# Patient Record
Sex: MALE | Race: WHITE | NOT HISPANIC OR LATINO | Employment: OTHER | RURAL
[De-identification: names, ages, dates, MRNs, and addresses within clinical notes are randomized per-mention and may not be internally consistent; named-entity substitution may affect disease eponyms.]

---

## 2017-08-15 ENCOUNTER — HISTORICAL (OUTPATIENT)
Dept: ADMINISTRATIVE | Facility: HOSPITAL | Age: 57
End: 2017-08-15

## 2017-08-16 LAB
LAB AP CLINICAL INFORMATION: NORMAL
LAB AP COMMENTS: NORMAL
LAB AP DIAGNOSIS - HISTORICAL: NORMAL
LAB AP GROSS PATHOLOGY - HISTORICAL: NORMAL
LAB AP SPECIMEN SUBMITTED - HISTORICAL: NORMAL

## 2019-10-07 ENCOUNTER — HISTORICAL (OUTPATIENT)
Dept: ADMINISTRATIVE | Facility: HOSPITAL | Age: 59
End: 2019-10-07

## 2019-10-07 LAB — CRC RECOMMENDATION EXT: NORMAL

## 2019-10-08 LAB
LAB AP CLINICAL INFORMATION: NORMAL
LAB AP DIAGNOSIS - HISTORICAL: NORMAL
LAB AP GROSS PATHOLOGY - HISTORICAL: NORMAL
LAB AP SPECIMEN SUBMITTED - HISTORICAL: NORMAL

## 2021-04-27 ENCOUNTER — OFFICE VISIT (OUTPATIENT)
Dept: UROLOGY | Facility: CLINIC | Age: 61
End: 2021-04-27
Payer: COMMERCIAL

## 2021-04-27 VITALS
WEIGHT: 200 LBS | DIASTOLIC BLOOD PRESSURE: 76 MMHG | SYSTOLIC BLOOD PRESSURE: 145 MMHG | HEIGHT: 72 IN | BODY MASS INDEX: 27.09 KG/M2 | HEART RATE: 65 BPM

## 2021-04-27 DIAGNOSIS — N41.1 CHRONIC PROSTATITIS: ICD-10-CM

## 2021-04-27 DIAGNOSIS — N52.9 ERECTILE DYSFUNCTION, UNSPECIFIED ERECTILE DYSFUNCTION TYPE: ICD-10-CM

## 2021-04-27 DIAGNOSIS — R97.20 ELEVATED PROSTATE SPECIFIC ANTIGEN (PSA): Primary | ICD-10-CM

## 2021-04-27 DIAGNOSIS — N40.1 BENIGN PROSTATIC HYPERPLASIA WITH URINARY FREQUENCY: ICD-10-CM

## 2021-04-27 DIAGNOSIS — R35.0 BENIGN PROSTATIC HYPERPLASIA WITH URINARY FREQUENCY: ICD-10-CM

## 2021-04-27 DIAGNOSIS — R97.20 ELEVATED PSA: Primary | ICD-10-CM

## 2021-04-27 PROCEDURE — 3008F BODY MASS INDEX DOCD: CPT | Mod: ,,, | Performed by: UROLOGY

## 2021-04-27 PROCEDURE — 99203 OFFICE O/P NEW LOW 30 MIN: CPT | Mod: PBBFAC | Performed by: UROLOGY

## 2021-04-27 PROCEDURE — 99214 OFFICE O/P EST MOD 30 MIN: CPT | Mod: S$PBB,,, | Performed by: UROLOGY

## 2021-04-27 PROCEDURE — 1126F AMNT PAIN NOTED NONE PRSNT: CPT | Mod: ,,, | Performed by: UROLOGY

## 2021-04-27 PROCEDURE — 99999 PR PBB SHADOW E&M-NEW PATIENT-LVL III: CPT | Mod: PBBFAC,,, | Performed by: UROLOGY

## 2021-04-27 PROCEDURE — 1126F PR PAIN SEVERITY QUANTIFIED, NO PAIN PRESENT: ICD-10-PCS | Mod: ,,, | Performed by: UROLOGY

## 2021-04-27 PROCEDURE — 99999 PR PBB SHADOW E&M-NEW PATIENT-LVL III: ICD-10-PCS | Mod: PBBFAC,,, | Performed by: UROLOGY

## 2021-04-27 PROCEDURE — 3008F PR BODY MASS INDEX (BMI) DOCUMENTED: ICD-10-PCS | Mod: ,,, | Performed by: UROLOGY

## 2021-04-27 PROCEDURE — 99214 PR OFFICE/OUTPT VISIT, EST, LEVL IV, 30-39 MIN: ICD-10-PCS | Mod: S$PBB,,, | Performed by: UROLOGY

## 2021-11-01 ENCOUNTER — OFFICE VISIT (OUTPATIENT)
Dept: FAMILY MEDICINE | Facility: CLINIC | Age: 61
End: 2021-11-01
Payer: COMMERCIAL

## 2021-11-01 ENCOUNTER — OFFICE VISIT (OUTPATIENT)
Dept: UROLOGY | Facility: CLINIC | Age: 61
End: 2021-11-01
Payer: COMMERCIAL

## 2021-11-01 ENCOUNTER — HOSPITAL ENCOUNTER (OUTPATIENT)
Dept: RADIOLOGY | Facility: HOSPITAL | Age: 61
Discharge: HOME | End: 2021-11-01
Attending: FAMILY MEDICINE
Payer: COMMERCIAL

## 2021-11-01 VITALS
SYSTOLIC BLOOD PRESSURE: 132 MMHG | HEART RATE: 61 BPM | TEMPERATURE: 98 F | HEIGHT: 71 IN | BODY MASS INDEX: 27.72 KG/M2 | RESPIRATION RATE: 18 BRPM | DIASTOLIC BLOOD PRESSURE: 67 MMHG | OXYGEN SATURATION: 98 % | WEIGHT: 198 LBS

## 2021-11-01 VITALS
WEIGHT: 198 LBS | SYSTOLIC BLOOD PRESSURE: 132 MMHG | BODY MASS INDEX: 26.82 KG/M2 | HEART RATE: 60 BPM | DIASTOLIC BLOOD PRESSURE: 71 MMHG | HEIGHT: 72 IN

## 2021-11-01 DIAGNOSIS — M70.22 OLECRANON BURSITIS OF LEFT ELBOW: Primary | ICD-10-CM

## 2021-11-01 DIAGNOSIS — N41.1 CHRONIC PROSTATITIS: ICD-10-CM

## 2021-11-01 DIAGNOSIS — N52.9 ERECTILE DYSFUNCTION, UNSPECIFIED ERECTILE DYSFUNCTION TYPE: ICD-10-CM

## 2021-11-01 DIAGNOSIS — N13.8 BENIGN PROSTATIC HYPERPLASIA WITH URINARY OBSTRUCTION: ICD-10-CM

## 2021-11-01 DIAGNOSIS — R97.20 ELEVATED PSA: Primary | ICD-10-CM

## 2021-11-01 DIAGNOSIS — N40.1 BENIGN PROSTATIC HYPERPLASIA WITH URINARY OBSTRUCTION: ICD-10-CM

## 2021-11-01 DIAGNOSIS — M25.422 SWELLING OF LEFT ELBOW: ICD-10-CM

## 2021-11-01 PROBLEM — N52.8 OTHER MALE ERECTILE DYSFUNCTION: Status: ACTIVE | Noted: 2021-11-01

## 2021-11-01 PROCEDURE — 1159F PR MEDICATION LIST DOCUMENTED IN MEDICAL RECORD: ICD-10-PCS | Mod: ,,, | Performed by: FAMILY MEDICINE

## 2021-11-01 PROCEDURE — 99213 PR OFFICE/OUTPT VISIT, EST, LEVL III, 20-29 MIN: ICD-10-PCS | Mod: S$PBB,,, | Performed by: UROLOGY

## 2021-11-01 PROCEDURE — 3008F PR BODY MASS INDEX (BMI) DOCUMENTED: ICD-10-PCS | Mod: ,,, | Performed by: UROLOGY

## 2021-11-01 PROCEDURE — 3075F SYST BP GE 130 - 139MM HG: CPT | Mod: ,,, | Performed by: UROLOGY

## 2021-11-01 PROCEDURE — 99213 OFFICE O/P EST LOW 20 MIN: CPT | Mod: PBBFAC | Performed by: UROLOGY

## 2021-11-01 PROCEDURE — 3075F SYST BP GE 130 - 139MM HG: CPT | Mod: ,,, | Performed by: FAMILY MEDICINE

## 2021-11-01 PROCEDURE — 99213 OFFICE O/P EST LOW 20 MIN: CPT | Mod: S$PBB,,, | Performed by: UROLOGY

## 2021-11-01 PROCEDURE — 99202 OFFICE O/P NEW SF 15 MIN: CPT | Mod: ,,, | Performed by: FAMILY MEDICINE

## 2021-11-01 PROCEDURE — 99202 PR OFFICE/OUTPT VISIT, NEW, LEVL II, 15-29 MIN: ICD-10-PCS | Mod: ,,, | Performed by: FAMILY MEDICINE

## 2021-11-01 PROCEDURE — 73070 X-RAY EXAM OF ELBOW: CPT | Mod: TC,LT

## 2021-11-01 PROCEDURE — 3078F PR MOST RECENT DIASTOLIC BLOOD PRESSURE < 80 MM HG: ICD-10-PCS | Mod: ,,, | Performed by: UROLOGY

## 2021-11-01 PROCEDURE — 3078F PR MOST RECENT DIASTOLIC BLOOD PRESSURE < 80 MM HG: ICD-10-PCS | Mod: ,,, | Performed by: FAMILY MEDICINE

## 2021-11-01 PROCEDURE — 3075F PR MOST RECENT SYSTOLIC BLOOD PRESS GE 130-139MM HG: ICD-10-PCS | Mod: ,,, | Performed by: FAMILY MEDICINE

## 2021-11-01 PROCEDURE — 3075F PR MOST RECENT SYSTOLIC BLOOD PRESS GE 130-139MM HG: ICD-10-PCS | Mod: ,,, | Performed by: UROLOGY

## 2021-11-01 PROCEDURE — 3008F BODY MASS INDEX DOCD: CPT | Mod: ,,, | Performed by: FAMILY MEDICINE

## 2021-11-01 PROCEDURE — 1159F MED LIST DOCD IN RCRD: CPT | Mod: ,,, | Performed by: FAMILY MEDICINE

## 2021-11-01 PROCEDURE — 3078F DIAST BP <80 MM HG: CPT | Mod: ,,, | Performed by: UROLOGY

## 2021-11-01 PROCEDURE — 3008F BODY MASS INDEX DOCD: CPT | Mod: ,,, | Performed by: UROLOGY

## 2021-11-01 PROCEDURE — 3008F PR BODY MASS INDEX (BMI) DOCUMENTED: ICD-10-PCS | Mod: ,,, | Performed by: FAMILY MEDICINE

## 2021-11-01 PROCEDURE — 3078F DIAST BP <80 MM HG: CPT | Mod: ,,, | Performed by: FAMILY MEDICINE

## 2021-11-01 RX ORDER — SILDENAFIL CITRATE 20 MG/1
TABLET ORAL
COMMUNITY
Start: 2021-08-02

## 2022-02-16 ENCOUNTER — OFFICE VISIT (OUTPATIENT)
Dept: FAMILY MEDICINE | Facility: CLINIC | Age: 62
End: 2022-02-16
Payer: COMMERCIAL

## 2022-02-16 VITALS
SYSTOLIC BLOOD PRESSURE: 117 MMHG | OXYGEN SATURATION: 97 % | BODY MASS INDEX: 27.72 KG/M2 | HEIGHT: 71 IN | DIASTOLIC BLOOD PRESSURE: 65 MMHG | TEMPERATURE: 99 F | HEART RATE: 86 BPM | RESPIRATION RATE: 18 BRPM | WEIGHT: 198 LBS

## 2022-02-16 DIAGNOSIS — R05.8 COUGH WITH EXPOSURE TO SEVERE ACUTE RESPIRATORY SYNDROME CORONAVIRUS 2 (SARS-COV-2): ICD-10-CM

## 2022-02-16 DIAGNOSIS — R05.9 COUGH: ICD-10-CM

## 2022-02-16 DIAGNOSIS — J02.9 SORE THROAT: ICD-10-CM

## 2022-02-16 DIAGNOSIS — J32.9 SINUSITIS, UNSPECIFIED CHRONICITY, UNSPECIFIED LOCATION: Primary | ICD-10-CM

## 2022-02-16 DIAGNOSIS — Z20.822 COUGH WITH EXPOSURE TO SEVERE ACUTE RESPIRATORY SYNDROME CORONAVIRUS 2 (SARS-COV-2): ICD-10-CM

## 2022-02-16 LAB
CTP QC/QA: YES
FLUAV AG NPH QL: NEGATIVE
FLUBV AG NPH QL: NEGATIVE
S PYO RRNA THROAT QL PROBE: NEGATIVE
SARS-COV-2 AG RESP QL IA.RAPID: NEGATIVE

## 2022-02-16 PROCEDURE — 87880 POCT RAPID STREP A: ICD-10-PCS | Mod: QW,,, | Performed by: FAMILY MEDICINE

## 2022-02-16 PROCEDURE — 87426 SARSCOV CORONAVIRUS AG IA: CPT | Mod: QW,,, | Performed by: FAMILY MEDICINE

## 2022-02-16 PROCEDURE — 3074F PR MOST RECENT SYSTOLIC BLOOD PRESSURE < 130 MM HG: ICD-10-PCS | Mod: ,,, | Performed by: FAMILY MEDICINE

## 2022-02-16 PROCEDURE — 1159F PR MEDICATION LIST DOCUMENTED IN MEDICAL RECORD: ICD-10-PCS | Mod: ,,, | Performed by: FAMILY MEDICINE

## 2022-02-16 PROCEDURE — 99214 OFFICE O/P EST MOD 30 MIN: CPT | Mod: 25,,, | Performed by: FAMILY MEDICINE

## 2022-02-16 PROCEDURE — 3078F PR MOST RECENT DIASTOLIC BLOOD PRESSURE < 80 MM HG: ICD-10-PCS | Mod: ,,, | Performed by: FAMILY MEDICINE

## 2022-02-16 PROCEDURE — 3078F DIAST BP <80 MM HG: CPT | Mod: ,,, | Performed by: FAMILY MEDICINE

## 2022-02-16 PROCEDURE — 87426 SARS CORONAVIRUS 2 ANTIGEN POCT: ICD-10-PCS | Mod: QW,,, | Performed by: FAMILY MEDICINE

## 2022-02-16 PROCEDURE — 3008F BODY MASS INDEX DOCD: CPT | Mod: ,,, | Performed by: FAMILY MEDICINE

## 2022-02-16 PROCEDURE — 99214 PR OFFICE/OUTPT VISIT, EST, LEVL IV, 30-39 MIN: ICD-10-PCS | Mod: 25,,, | Performed by: FAMILY MEDICINE

## 2022-02-16 PROCEDURE — 1159F MED LIST DOCD IN RCRD: CPT | Mod: ,,, | Performed by: FAMILY MEDICINE

## 2022-02-16 PROCEDURE — 87804 INFLUENZA ASSAY W/OPTIC: CPT | Mod: QW,,, | Performed by: FAMILY MEDICINE

## 2022-02-16 PROCEDURE — 1160F RVW MEDS BY RX/DR IN RCRD: CPT | Mod: ,,, | Performed by: FAMILY MEDICINE

## 2022-02-16 PROCEDURE — 87880 STREP A ASSAY W/OPTIC: CPT | Mod: QW,,, | Performed by: FAMILY MEDICINE

## 2022-02-16 PROCEDURE — 87804 POCT INFLUENZA A/B: ICD-10-PCS | Mod: QW,,, | Performed by: FAMILY MEDICINE

## 2022-02-16 PROCEDURE — 96372 PR INJECTION,THERAP/PROPH/DIAG2ST, IM OR SUBCUT: ICD-10-PCS | Mod: ,,, | Performed by: FAMILY MEDICINE

## 2022-02-16 PROCEDURE — 3008F PR BODY MASS INDEX (BMI) DOCUMENTED: ICD-10-PCS | Mod: ,,, | Performed by: FAMILY MEDICINE

## 2022-02-16 PROCEDURE — 1160F PR REVIEW ALL MEDS BY PRESCRIBER/CLIN PHARMACIST DOCUMENTED: ICD-10-PCS | Mod: ,,, | Performed by: FAMILY MEDICINE

## 2022-02-16 PROCEDURE — 3074F SYST BP LT 130 MM HG: CPT | Mod: ,,, | Performed by: FAMILY MEDICINE

## 2022-02-16 PROCEDURE — 96372 THER/PROPH/DIAG INJ SC/IM: CPT | Mod: ,,, | Performed by: FAMILY MEDICINE

## 2022-02-16 RX ORDER — AMOXICILLIN 875 MG/1
875 TABLET, FILM COATED ORAL EVERY 12 HOURS
Qty: 14 TABLET | Refills: 0 | Status: SHIPPED | OUTPATIENT
Start: 2022-02-16 | End: 2022-05-02

## 2022-02-16 RX ORDER — METHYLPREDNISOLONE ACETATE 40 MG/ML
40 INJECTION, SUSPENSION INTRA-ARTICULAR; INTRALESIONAL; INTRAMUSCULAR; SOFT TISSUE
Status: COMPLETED | OUTPATIENT
Start: 2022-02-16 | End: 2022-02-16

## 2022-02-16 RX ORDER — DEXAMETHASONE SODIUM PHOSPHATE 4 MG/ML
4 INJECTION, SOLUTION INTRA-ARTICULAR; INTRALESIONAL; INTRAMUSCULAR; INTRAVENOUS; SOFT TISSUE
Status: COMPLETED | OUTPATIENT
Start: 2022-02-16 | End: 2022-02-16

## 2022-02-16 RX ADMIN — DEXAMETHASONE SODIUM PHOSPHATE 4 MG: 4 INJECTION, SOLUTION INTRA-ARTICULAR; INTRALESIONAL; INTRAMUSCULAR; INTRAVENOUS; SOFT TISSUE at 03:02

## 2022-02-16 RX ADMIN — METHYLPREDNISOLONE ACETATE 40 MG: 40 INJECTION, SUSPENSION INTRA-ARTICULAR; INTRALESIONAL; INTRAMUSCULAR; SOFT TISSUE at 03:02

## 2022-02-16 NOTE — PROGRESS NOTES
New Clinic Note    Neville Polanco is a 61 y.o. male     CC:   Chief Complaint   Patient presents with    Cough     Patient has been working on removing britany in a house that had black ,old under it fr the past two days. Yesterday he started having sinus drainage, cough and runny nose. He is pretty sure it is just allergy related due to the dust and black mold.    Sinus Problem     Sinus drainage     Sore Throat    Hoarse        Subjective    History of Present Illness HPI   Patient complains of 2 days of cough and congestion. He complains of sore throat and laryngitis. He denies fever. He has not taken anything for his symptoms.     Upper or lower respiratory infection symptoms:      When did your symptoms start: yesterday  Are they getting worse: yes  What are your symptoms:cough, sore throat, sinus drainage and hoarseness  Do you have a cough: yes  Is it productive:   yes     what color: sputum thick white sputum      Current Outpatient Medications:     amoxicillin (AMOXIL) 875 MG tablet, Take 1 tablet (875 mg total) by mouth every 12 (twelve) hours., Disp: 14 tablet, Rfl: 0    brompheniramin-phenylephrin-DM (RYNEX DM) 1-2.5-5 mg/5 mL Soln, Take 10 mLs by mouth every 4 (four) hours as needed., Disp: 180 mL, Rfl: 1    sildenafil (REVATIO) 20 mg Tab, TAKE 2-5 TABLETS BY MOUTH 1 HOUR PRIOR TO INTERCOURSE (DO NOT TAKE WITH NITROGLYCERIN, NITRATES), Disp: , Rfl:   No current facility-administered medications for this visit.     Past Medical History:   Diagnosis Date    Benign prostatic hyperplasia with lower urinary tract symptoms     Chronic prostatitis     Elevated PSA     Male erectile dysfunction, unspecified         Family History   Problem Relation Age of Onset    Aneurysm Mother     COPD Father         History reviewed. No pertinent surgical history.     Review of Systems   Constitutional: Negative for fatigue and fever.   HENT: Positive for postnasal drip, rhinorrhea, sinus pressure/congestion  "and sore throat. Negative for ear pain.    Respiratory: Positive for cough. Negative for shortness of breath.    Cardiovascular: Negative for chest pain.   Gastrointestinal: Negative for abdominal pain, diarrhea, nausea and vomiting.   Genitourinary: Negative for dysuria.   Neurological: Positive for headaches.        /65 (BP Location: Right arm, Patient Position: Sitting, BP Method: Medium (Automatic))   Pulse 86   Temp 99.3 °F (37.4 °C) (Oral)   Resp 18   Ht 5' 11" (1.803 m)   Wt 89.8 kg (198 lb)   SpO2 97%   BMI 27.62 kg/m²      Physical Exam  HENT:      Head: Normocephalic and atraumatic.      Mouth/Throat:      Pharynx: Oropharynx is clear.   Pulmonary:      Effort: Pulmonary effort is normal.   Neurological:      Mental Status: He is alert and oriented to person, place, and time.   Psychiatric:         Mood and Affect: Mood normal.         Behavior: Behavior normal.          Assessment and Plan      ICD-10-CM ICD-9-CM   1. Sinusitis, unspecified chronicity, unspecified location  J32.9 473.9   2. Cough  R05.9 786.2   3. Cough with exposure to severe acute respiratory syndrome coronavirus 2 (SARS-CoV-2)  R05.8 786.2    Z20.822 V01.79   4. Sore throat  J02.9 462        Problem List Items Addressed This Visit    None     Visit Diagnoses     Sinusitis, unspecified chronicity, unspecified location    -  Primary    Relevant Medications    methylPREDNISolone acetate injection 40 mg (Completed)    dexamethasone injection 4 mg (Completed)    brompheniramin-phenylephrin-DM (RYNEX DM) 1-2.5-5 mg/5 mL Soln    amoxicillin (AMOXIL) 875 MG tablet    Cough        Relevant Orders    POCT Influenza A/B (Completed)    Cough with exposure to severe acute respiratory syndrome coronavirus 2 (SARS-CoV-2)        Relevant Orders    SARS Coronavirus 2 Antigen, POCT (Completed)    Sore throat        Relevant Orders    POCT rapid strep A (Completed)           Patient Instructions   1. Take medications as directed  2. Monitor " temperature, if fever begins, tyelenol or ibuprofen can be used as long as you have no history of abnormal reactions to these medications. Follow the instructions on the bottle or contact clinic with questions.   3. Please contact clinic if symptoms begin to get worse.   4. Report to the ER if you feel your symptoms are severe and life threatening.          Follow up if symptoms worsen or fail to improve.

## 2022-05-02 ENCOUNTER — OFFICE VISIT (OUTPATIENT)
Dept: UROLOGY | Facility: CLINIC | Age: 62
End: 2022-05-02
Payer: COMMERCIAL

## 2022-05-02 ENCOUNTER — OFFICE VISIT (OUTPATIENT)
Dept: FAMILY MEDICINE | Facility: CLINIC | Age: 62
End: 2022-05-02
Payer: COMMERCIAL

## 2022-05-02 VITALS
HEART RATE: 66 BPM | BODY MASS INDEX: 26.01 KG/M2 | WEIGHT: 192 LBS | HEIGHT: 72 IN | DIASTOLIC BLOOD PRESSURE: 89 MMHG | SYSTOLIC BLOOD PRESSURE: 152 MMHG

## 2022-05-02 VITALS
HEIGHT: 70 IN | SYSTOLIC BLOOD PRESSURE: 138 MMHG | BODY MASS INDEX: 27.63 KG/M2 | TEMPERATURE: 97 F | RESPIRATION RATE: 18 BRPM | HEART RATE: 63 BPM | DIASTOLIC BLOOD PRESSURE: 72 MMHG | WEIGHT: 193 LBS | OXYGEN SATURATION: 98 %

## 2022-05-02 DIAGNOSIS — N40.1 BENIGN PROSTATIC HYPERPLASIA WITH URINARY OBSTRUCTION: ICD-10-CM

## 2022-05-02 DIAGNOSIS — N13.8 BENIGN PROSTATIC HYPERPLASIA WITH URINARY OBSTRUCTION: ICD-10-CM

## 2022-05-02 DIAGNOSIS — R97.20 ELEVATED PSA: ICD-10-CM

## 2022-05-02 DIAGNOSIS — N41.1 CHRONIC PROSTATITIS: ICD-10-CM

## 2022-05-02 DIAGNOSIS — Z13.220 SCREENING FOR LIPID DISORDERS: ICD-10-CM

## 2022-05-02 DIAGNOSIS — R03.0 ELEVATED BLOOD-PRESSURE READING WITHOUT DIAGNOSIS OF HYPERTENSION: ICD-10-CM

## 2022-05-02 DIAGNOSIS — E78.2 MIXED HYPERLIPIDEMIA: ICD-10-CM

## 2022-05-02 DIAGNOSIS — N52.9 ERECTILE DYSFUNCTION, UNSPECIFIED ERECTILE DYSFUNCTION TYPE: ICD-10-CM

## 2022-05-02 DIAGNOSIS — R97.20 ELEVATED PSA: Primary | ICD-10-CM

## 2022-05-02 DIAGNOSIS — Z00.01 ENCOUNTER FOR ANNUAL GENERAL MEDICAL EXAMINATION WITH ABNORMAL FINDINGS IN ADULT: Primary | ICD-10-CM

## 2022-05-02 DIAGNOSIS — Z13.1 SCREENING FOR DIABETES MELLITUS (DM): ICD-10-CM

## 2022-05-02 LAB
CHOLEST SERPL-MCNC: 208 MG/DL (ref 0–200)
CHOLEST/HDLC SERPL: 4.3 {RATIO}
GLUCOSE SERPL-MCNC: 103 MG/DL (ref 74–106)
HDLC SERPL-MCNC: 48 MG/DL (ref 40–60)
LDLC SERPL CALC-MCNC: 130 MG/DL
LDLC/HDLC SERPL: 2.7 {RATIO}
NONHDLC SERPL-MCNC: 160 MG/DL
PSA SERPL-MCNC: 5.8 NG/ML (ref 0–4.1)
TRIGL SERPL-MCNC: 148 MG/DL (ref 35–150)
VLDLC SERPL-MCNC: 30 MG/DL

## 2022-05-02 PROCEDURE — 3075F SYST BP GE 130 - 139MM HG: CPT | Mod: ,,, | Performed by: FAMILY MEDICINE

## 2022-05-02 PROCEDURE — 3077F PR MOST RECENT SYSTOLIC BLOOD PRESSURE >= 140 MM HG: ICD-10-PCS | Mod: ,,, | Performed by: UROLOGY

## 2022-05-02 PROCEDURE — 3077F SYST BP >= 140 MM HG: CPT | Mod: ,,, | Performed by: UROLOGY

## 2022-05-02 PROCEDURE — 80061 LIPID PANEL: ICD-10-PCS | Mod: ,,, | Performed by: CLINICAL MEDICAL LABORATORY

## 2022-05-02 PROCEDURE — 3075F PR MOST RECENT SYSTOLIC BLOOD PRESS GE 130-139MM HG: ICD-10-PCS | Mod: ,,, | Performed by: FAMILY MEDICINE

## 2022-05-02 PROCEDURE — 3078F PR MOST RECENT DIASTOLIC BLOOD PRESSURE < 80 MM HG: ICD-10-PCS | Mod: ,,, | Performed by: FAMILY MEDICINE

## 2022-05-02 PROCEDURE — 3079F DIAST BP 80-89 MM HG: CPT | Mod: ,,, | Performed by: UROLOGY

## 2022-05-02 PROCEDURE — 3008F BODY MASS INDEX DOCD: CPT | Mod: ,,, | Performed by: FAMILY MEDICINE

## 2022-05-02 PROCEDURE — 80061 LIPID PANEL: CPT | Mod: ,,, | Performed by: CLINICAL MEDICAL LABORATORY

## 2022-05-02 PROCEDURE — 1159F PR MEDICATION LIST DOCUMENTED IN MEDICAL RECORD: ICD-10-PCS | Mod: ,,, | Performed by: FAMILY MEDICINE

## 2022-05-02 PROCEDURE — 3008F PR BODY MASS INDEX (BMI) DOCUMENTED: ICD-10-PCS | Mod: ,,, | Performed by: UROLOGY

## 2022-05-02 PROCEDURE — 99213 PR OFFICE/OUTPT VISIT, EST, LEVL III, 20-29 MIN: ICD-10-PCS | Mod: S$PBB,,, | Performed by: UROLOGY

## 2022-05-02 PROCEDURE — 1159F PR MEDICATION LIST DOCUMENTED IN MEDICAL RECORD: ICD-10-PCS | Mod: ,,, | Performed by: UROLOGY

## 2022-05-02 PROCEDURE — 1159F MED LIST DOCD IN RCRD: CPT | Mod: ,,, | Performed by: FAMILY MEDICINE

## 2022-05-02 PROCEDURE — 82947 GLUCOSE, FASTING: ICD-10-PCS | Mod: ,,, | Performed by: CLINICAL MEDICAL LABORATORY

## 2022-05-02 PROCEDURE — 99396 PR PREVENTIVE VISIT,EST,40-64: ICD-10-PCS | Mod: ,,, | Performed by: FAMILY MEDICINE

## 2022-05-02 PROCEDURE — 3008F PR BODY MASS INDEX (BMI) DOCUMENTED: ICD-10-PCS | Mod: ,,, | Performed by: FAMILY MEDICINE

## 2022-05-02 PROCEDURE — 99213 OFFICE O/P EST LOW 20 MIN: CPT | Mod: S$PBB,,, | Performed by: UROLOGY

## 2022-05-02 PROCEDURE — 99213 OFFICE O/P EST LOW 20 MIN: CPT | Mod: PBBFAC | Performed by: UROLOGY

## 2022-05-02 PROCEDURE — 3078F DIAST BP <80 MM HG: CPT | Mod: ,,, | Performed by: FAMILY MEDICINE

## 2022-05-02 PROCEDURE — 1160F PR REVIEW ALL MEDS BY PRESCRIBER/CLIN PHARMACIST DOCUMENTED: ICD-10-PCS | Mod: ,,, | Performed by: FAMILY MEDICINE

## 2022-05-02 PROCEDURE — 1160F RVW MEDS BY RX/DR IN RCRD: CPT | Mod: ,,, | Performed by: FAMILY MEDICINE

## 2022-05-02 PROCEDURE — 82947 ASSAY GLUCOSE BLOOD QUANT: CPT | Mod: ,,, | Performed by: CLINICAL MEDICAL LABORATORY

## 2022-05-02 PROCEDURE — 3079F PR MOST RECENT DIASTOLIC BLOOD PRESSURE 80-89 MM HG: ICD-10-PCS | Mod: ,,, | Performed by: UROLOGY

## 2022-05-02 PROCEDURE — 3008F BODY MASS INDEX DOCD: CPT | Mod: ,,, | Performed by: UROLOGY

## 2022-05-02 PROCEDURE — 1159F MED LIST DOCD IN RCRD: CPT | Mod: ,,, | Performed by: UROLOGY

## 2022-05-02 PROCEDURE — 99396 PREV VISIT EST AGE 40-64: CPT | Mod: ,,, | Performed by: FAMILY MEDICINE

## 2022-05-02 NOTE — PROGRESS NOTES
"New Clinic Note    Neville Polanco is a 61 y.o. male     CC:   Chief Complaint   Patient presents with    Annual Exam     Patient stated he is here for a [facility] visit.         Subjective    History of Present Illness HPI   Patient is here for a [facility] you visit.     Current Outpatient Medications:     sildenafil (REVATIO) 20 mg Tab, TAKE 2-5 TABLETS BY MOUTH 1 HOUR PRIOR TO INTERCOURSE (DO NOT TAKE WITH NITROGLYCERIN, NITRATES), Disp: , Rfl:      Past Medical History:   Diagnosis Date    Benign prostatic hyperplasia with lower urinary tract symptoms     Chronic prostatitis     Elevated PSA     Male erectile dysfunction, unspecified         Family History   Problem Relation Age of Onset    Aneurysm Mother     COPD Father         History reviewed. No pertinent surgical history.     Review of Systems   Constitutional: Negative for fatigue and fever.   HENT: Negative for ear pain, postnasal drip, rhinorrhea and sinus pressure/congestion.    Respiratory: Negative for cough and shortness of breath.    Cardiovascular: Negative for chest pain.   Gastrointestinal: Negative for abdominal pain, diarrhea, nausea and vomiting.   Genitourinary: Negative for dysuria.   Neurological: Negative for headaches.        /72 (BP Location: Left arm, Patient Position: Sitting, BP Method: Large (Automatic))   Pulse 63   Temp 97 °F (36.1 °C) (Oral)   Resp 18   Ht 5' 10" (1.778 m)   Wt 87.5 kg (193 lb)   SpO2 98%   BMI 27.69 kg/m²      Physical Exam  HENT:      Head: Normocephalic and atraumatic.   Cardiovascular:      Rate and Rhythm: Normal rate and regular rhythm.   Pulmonary:      Effort: Pulmonary effort is normal.      Breath sounds: Normal breath sounds.   Skin:     Findings: Erythema present.      Comments: Patient has an area of erythema and dry skin on the end of nose   Neurological:      Mental Status: He is alert and oriented to person, place, and time.   Psychiatric:         Mood and " Affect: Mood normal.         Behavior: Behavior normal.          Assessment and Plan      ICD-10-CM ICD-9-CM   1. Encounter for annual general medical examination with abnormal findings in adult  Z00.01 V70.0   2. Screening for diabetes mellitus (DM)  Z13.1 V77.1   3. Screening for lipid disorders  Z13.220 V77.91   4. Elevated PSA  R97.20 790.93   5. Mixed hyperlipidemia  E78.2 272.2   6. Elevated blood-pressure reading without diagnosis of hypertension  R03.0 796.2        Problem List Items Addressed This Visit    None     Visit Diagnoses     Encounter for annual general medical examination with abnormal findings in adult    -  Primary    Screening for diabetes mellitus (DM)        Relevant Orders    Glucose, Fasting (Completed)    Screening for lipid disorders        Relevant Orders    Lipid Panel (Completed)    Elevated PSA        Mixed hyperlipidemia        Elevated blood-pressure reading without diagnosis of hypertension             [employer] Wellness Plan:    Health goals to improve overall health and well-being: improve healthy choices  Biometrics:  attend regular scheduled office visit  Lifestyle:  develop good social support group  Nutrition:  eat well balanced meals  Exercise:  recommended physical activity 30 minutes 5 days a week  Tobacco:  avoid all tobacco products including second and  exposure        Patient Instructions   Use aquaphor on dry skin.        Follow up in about 1 year (around 5/2/2023).

## 2022-05-02 NOTE — PATIENT INSTRUCTIONS
Patient doing okay with voiding.  PSA returned after he left as 6.120.  This is the highest he has ever had but he had MRI just couple years ago.  I think we will wait another 6 months before deciding about repeat MRI.  New prescription given as written prescription for sildenafil 100 mg number 60 with 11 refills with instructions to take 1 hour before intercourse.  Six month appointment with PSA.

## 2022-05-02 NOTE — PROGRESS NOTES
Subjective:       Patient ID: Neville Polanco is a 61 y.o. male.    Chief Complaint: Follow-up (Six month PSA, R97.20 and office visit. )      History of Present Illness  Mr. Polanco is 53 years old. He is a  with silicosis of the right lung. Sent to me by Dr. Sanjana Mclean for high normal PSA and low normal testosterone values. Main complaint is lack of energy. This is something that seems to have started only in the relatively recent past. PSA was the first one he ever had done and was on June 8; it was right at the upper end of normal at 3.110, which is right at the upper end of normal. His testosterone was 335 which is lower end of normal. Neither study has been repeated for documentation. There is no history of urinary tract infections, stones, hematuria, or any other urinary tract problems. To his knowledge there  is no family history of prostate cancer.  (August 6, 2014)     Mr. Polanco is brought back today for 6 month PSA since the PSA he had in June was in the very high normal range. We did repeat his testosterone on the above visit and it is normal at 304. The previous one had been 335. These are in the low normal range but are normal. No improvement in energy level. No new medical problems.  (December 15, 2014)     Mr. Polanco is basically the same as he was one year ago. In for his yearly checkup and his PSA is stable at 2.54. Testosterone is still technically normal at 278.1. He  has no significant energy level and loss of libido. He has very minimal sexual function. He would like to try PDE 5 inhibitors to help.  (December 15, 2015)     Mr. Polanco has had Good year in general and is voiding as well as a year ago. PSA has jumped up to 4.770 which is surprising because he is having no symptoms of prostate infection, UTIs, or any other increased problems.  (February 7, 2017)     Mr. Polanco returned for follow-up today with PSA. Was treated with Cipro on last visit but was only able to take  about half of that. He developed some rash on the parts of his body exposed to sunlight and may have had a photosensitivity related to taking medication. I don't think he had a true reaction to the drug. He only took about half. Doing okay now and PSA down slightly but still elevated for age. Voiding is stable. He did not get sildenafil filled because his drugstore apparently did not have it. Needs new prescription he can take around and get the best price.  (May 9, 2017)     Mr. Polanco was brought back because of PSA elevation. PSA today back up to 4.570. Clinically he is doing okay without any increase in voiding difficulty but definitely has elevated PSA. Probably needs to consider biopsies. (August 14 2017)     Mr. Polanco  returned today for follow-up of his chronically elevated PSA. He had biopsies on last visit and it showed acute and chronic prostatitis without any significant dysplasia, PIN, other premalignant changes. He did try the generic sildenafi and it did work satisfactorily for him. Doing okay clinically without any worsening bladder outlet obstructive symptoms or other difficulty. Patient waited to L5 hours for PSA to be returned and it never returned while he was here. Subsequently returned after he left as 3.520. Patient notified by phone.  (February 14, 2018)     Mr. Polanco is in for six-month follow-up of chronic PSA elevation. His PSA was actually down slightly last time. He's had a good 6 months with no increasing problems with voiding or other difficulties. PSA is pending.  (August 24, 2018)     Mr. Polanco has had a good 6 months overall as far as health problems are concerned. PSA back up slightly to 4.21 but has been riding a tractor for the last couple weeks working very hard. Certainly that could be a factor. No worsening bladder outlet obstructive symptoms or other new problems.  (February 25, 2019)     Mr. Polanco is in for six-month follow-up of PSA elevation. He had biopsies of  "his prostate 2 years ago. No increased trouble with voiding. Overall has had a good 6 months. A little bit of bleeding from his rectum recently and feels he might have a "polyp" but really has never had a colonoscopy and needs to have that checked because of the bleeding. PSA returned as 5.670. Has been 2 years since his biopsies. He probably needs to have additional biopsies or MRI of the prostate. He would prefer to have an MRI first before additional biopsies. (August 26, 2019)     Mr. Polanco is in for six-month follow-up of chronic PSA elevation. He had his MRI of the prostate done on October 1 at [facility]. This revealed only PI-rads 2 lesion and additional biopsies were not needed. He is stable with voiding. Had a PSA done today that is pending. He's had no further rectal bleeding. Weight is exactly the same as was 6 months ago. No new health issues. (April 20, 2020)     Mr. Polanco is in for six-month follow-up of chronic PSA elevation. Had MRI of the prostate last year and did not reveal suspicious lesions. He is stable with voiding in general and has no worsening bladder outlet obstructive symptoms. He feels he does not need anything to help with voiding. Stable overall and has had a good 6 months. (October 27, 2020)  ------------------------------------------------------------------------------------------------------------------------------------------------------------     PSA Results:  Past PSA Results   PSA was 4.870 on October 27, 2020, Past PSA Results   PSA was 3.750 on August 24, 2018, Past PSA Results   PSA was 3.520 on February 14, 2018  PSA was 4.570 on 8/14/2017  4.090 on May 9, 2017  4.770 on February 7, 2017  2.540 on December 15, 2015  2.490 on December 15, 2014  3.110 on June 8, 2014  -------------------------------------------------------------------------------------------------------------------------------------------------------------------------------------------     . " Sherri is in for six-month follow-up of chronic PSA elevation.  He is doing fine clinically with no worsening bladder outlet obstructive symptoms.  PSA pending when he was here.  One previous negative biopsy in 1 MRI that did not reveal suspicious lesions.  PSA returned after he left and is up slightly to 5.490. (April 27, 2021)     PSA was 5.490 on April 27, 2021  PSA was 5.010 on November 1, 2021   PSA was 6.120 on May 2, 2022     Mr. Polanco is in for 6 month follow-up of PSA elevation.  He has had a good 6 months with no worsening bladder outlet obstructive symptoms.  He has had no new health issues that he is aware of.  He had his PSA drawn and results are pending.(November 1, 2021)     Mr. Polanco is in for his 6 monthFollow-up of PSA elevation.  PSA drawn earlier today and is pending.  He has had a reasonably good 6 months except when he aspirated some black mold causing acute pulmonary problems.  That problem is better now.  No new  issues.  Voiding as well as he did last time he was here. (May 2, 2022)    Review of Systems      Objective:      Physical Exam  Constitutional:       Appearance: Normal appearance. He is normal weight.   Genitourinary:     Prostate: Normal.      Rectum: Normal.      Comments: Prostate gland 30 g smooth firm and symmetrical  Neurological:      Mental Status: He is alert.   Psychiatric:         Mood and Affect: Mood normal.         Behavior: Behavior normal.       urinalysis revealed only occasional pus cells.  Dipstick negative with pH 5.0 and specific gravity 1.010  Assessment:       Problem List Items Addressed This Visit    None     Visit Diagnoses     Elevated PSA    -  Primary    Relevant Orders    PSA, Total (Diagnostic)    Chronic prostatitis        Erectile dysfunction, unspecified erectile dysfunction type        Benign prostatic hyperplasia with urinary obstruction              Plan:     Patient doing okay with voiding.  PSA returned after he left as 6.120.  This is  the highest he has ever had but he had MRI just couple years ago.  I think we will wait another 6 months before deciding about repeat MRI.  New prescription given as written prescription for sildenafil 100 mg number 60 with 11 refills with instructions to take 1 hour before intercourse.  Six month appointment with PSA.  *

## 2022-11-08 ENCOUNTER — OFFICE VISIT (OUTPATIENT)
Dept: UROLOGY | Facility: CLINIC | Age: 62
End: 2022-11-08
Payer: COMMERCIAL

## 2022-11-08 ENCOUNTER — OFFICE VISIT (OUTPATIENT)
Dept: FAMILY MEDICINE | Facility: CLINIC | Age: 62
End: 2022-11-08
Payer: COMMERCIAL

## 2022-11-08 VITALS
DIASTOLIC BLOOD PRESSURE: 80 MMHG | BODY MASS INDEX: 26.41 KG/M2 | SYSTOLIC BLOOD PRESSURE: 118 MMHG | HEART RATE: 83 BPM | HEIGHT: 72 IN | OXYGEN SATURATION: 95 % | WEIGHT: 195 LBS | RESPIRATION RATE: 18 BRPM

## 2022-11-08 VITALS
DIASTOLIC BLOOD PRESSURE: 69 MMHG | HEIGHT: 72 IN | BODY MASS INDEX: 26.41 KG/M2 | SYSTOLIC BLOOD PRESSURE: 144 MMHG | WEIGHT: 195 LBS | HEART RATE: 61 BPM

## 2022-11-08 DIAGNOSIS — N13.8 BENIGN PROSTATIC HYPERPLASIA WITH URINARY OBSTRUCTION: ICD-10-CM

## 2022-11-08 DIAGNOSIS — N52.9 ERECTILE DYSFUNCTION, UNSPECIFIED ERECTILE DYSFUNCTION TYPE: ICD-10-CM

## 2022-11-08 DIAGNOSIS — Z02.89 ENCOUNTER FOR OTHER ADMINISTRATIVE EXAMINATIONS: Primary | ICD-10-CM

## 2022-11-08 DIAGNOSIS — N40.1 BENIGN PROSTATIC HYPERPLASIA WITH URINARY OBSTRUCTION: ICD-10-CM

## 2022-11-08 DIAGNOSIS — R97.20 ELEVATED PSA: Primary | ICD-10-CM

## 2022-11-08 DIAGNOSIS — N41.1 CHRONIC PROSTATITIS: ICD-10-CM

## 2022-11-08 PROCEDURE — 1159F MED LIST DOCD IN RCRD: CPT | Mod: ,,, | Performed by: UROLOGY

## 2022-11-08 PROCEDURE — 3078F DIAST BP <80 MM HG: CPT | Mod: ,,, | Performed by: UROLOGY

## 2022-11-08 PROCEDURE — 3077F PR MOST RECENT SYSTOLIC BLOOD PRESSURE >= 140 MM HG: ICD-10-PCS | Mod: ,,, | Performed by: UROLOGY

## 2022-11-08 PROCEDURE — 3008F PR BODY MASS INDEX (BMI) DOCUMENTED: ICD-10-PCS | Mod: ,,, | Performed by: UROLOGY

## 2022-11-08 PROCEDURE — 99214 OFFICE O/P EST MOD 30 MIN: CPT | Mod: PBBFAC | Performed by: UROLOGY

## 2022-11-08 PROCEDURE — 99213 PR OFFICE/OUTPT VISIT, EST, LEVL III, 20-29 MIN: ICD-10-PCS | Mod: S$PBB,,, | Performed by: UROLOGY

## 2022-11-08 PROCEDURE — 1159F PR MEDICATION LIST DOCUMENTED IN MEDICAL RECORD: ICD-10-PCS | Mod: ,,, | Performed by: UROLOGY

## 2022-11-08 PROCEDURE — 1160F RVW MEDS BY RX/DR IN RCRD: CPT | Mod: ,,, | Performed by: UROLOGY

## 2022-11-08 PROCEDURE — 99499 PR PHYSICAL - DOT/CDL: ICD-10-PCS | Mod: ,,, | Performed by: NURSE PRACTITIONER

## 2022-11-08 PROCEDURE — 3077F SYST BP >= 140 MM HG: CPT | Mod: ,,, | Performed by: UROLOGY

## 2022-11-08 PROCEDURE — 1160F PR REVIEW ALL MEDS BY PRESCRIBER/CLIN PHARMACIST DOCUMENTED: ICD-10-PCS | Mod: ,,, | Performed by: UROLOGY

## 2022-11-08 PROCEDURE — 3008F BODY MASS INDEX DOCD: CPT | Mod: ,,, | Performed by: UROLOGY

## 2022-11-08 PROCEDURE — 3078F PR MOST RECENT DIASTOLIC BLOOD PRESSURE < 80 MM HG: ICD-10-PCS | Mod: ,,, | Performed by: UROLOGY

## 2022-11-08 PROCEDURE — 99213 OFFICE O/P EST LOW 20 MIN: CPT | Mod: S$PBB,,, | Performed by: UROLOGY

## 2022-11-08 PROCEDURE — 99499 UNLISTED E&M SERVICE: CPT | Mod: ,,, | Performed by: NURSE PRACTITIONER

## 2022-11-08 NOTE — PROGRESS NOTES
Subjective:       Patient ID: Neville Polanco is a 61 y.o. male.    Chief Complaint: Follow-up (Six month PSA and office visit. )       History of Present Illness  Mr. Polanco is 53 years old. He is a  with silicosis of the right lung. Sent to me by Dr. Sanjana Mclean for high normal PSA and low normal testosterone values. Main complaint is lack of energy. This is something that seems to have started only in the relatively recent past. PSA was the first one he ever had done and was on June 8; it was right at the upper end of normal at 3.110, which is right at the upper end of normal. His testosterone was 335 which is lower end of normal. Neither study has been repeated for documentation. There is no history of urinary tract infections, stones, hematuria, or any other urinary tract problems. To his knowledge there  is no family history of prostate cancer.  (August 6, 2014)     Mr. Polanco is brought back today for 6 month PSA since the PSA he had in June was in the very high normal range. We did repeat his testosterone on the above visit and it is normal at 304. The previous one had been 335. These are in the low normal range but are normal. No improvement in energy level. No new medical problems.  (December 15, 2014)     Mr. Polanco is basically the same as he was one year ago. In for his yearly checkup and his PSA is stable at 2.54. Testosterone is still technically normal at 278.1. He  has no significant energy level and loss of libido. He has very minimal sexual function. He would like to try PDE 5 inhibitors to help.  (December 15, 2015)     Mr. Polanco has had Good year in general and is voiding as well as a year ago. PSA has jumped up to 4.770 which is surprising because he is having no symptoms of prostate infection, UTIs, or any other increased problems.  (February 7, 2017)     Mr. Polanco returned for follow-up today with PSA. Was treated with Cipro on last visit but was only able to take about  half of that. He developed some rash on the parts of his body exposed to sunlight and may have had a photosensitivity related to taking medication. I don't think he had a true reaction to the drug. He only took about half. Doing okay now and PSA down slightly but still elevated for age. Voiding is stable. He did not get sildenafil filled because his drugstore apparently did not have it. Needs new prescription he can take around and get the best price.  (May 9, 2017)     Mr. Polanco was brought back because of PSA elevation. PSA today back up to 4.570. Clinically he is doing okay without any increase in voiding difficulty but definitely has elevated PSA. Probably needs to consider biopsies. (August 14 2017)     Mr. Polanco  returned today for follow-up of his chronically elevated PSA. He had biopsies on last visit and it showed acute and chronic prostatitis without any significant dysplasia, PIN, other premalignant changes. He did try the generic sildenafi and it did work satisfactorily for him. Doing okay clinically without any worsening bladder outlet obstructive symptoms or other difficulty. Patient waited to L5 hours for PSA to be returned and it never returned while he was here. Subsequently returned after he left as 3.520. Patient notified by phone.  (February 14, 2018)     Mr. Polanco is in for six-month follow-up of chronic PSA elevation. His PSA was actually down slightly last time. He's had a good 6 months with no increasing problems with voiding or other difficulties. PSA is pending.  (August 24, 2018)     Mr. Polanco has had a good 6 months overall as far as health problems are concerned. PSA back up slightly to 4.21 but has been riding a tractor for the last couple weeks working very hard. Certainly that could be a factor. No worsening bladder outlet obstructive symptoms or other new problems.  (February 25, 2019)     Mr. Polanco is in for six-month follow-up of PSA elevation. He had biopsies of his  "prostate 2 years ago. No increased trouble with voiding. Overall has had a good 6 months. A little bit of bleeding from his rectum recently and feels he might have a "polyp" but really has never had a colonoscopy and needs to have that checked because of the bleeding. PSA returned as 5.670. Has been 2 years since his biopsies. He probably needs to have additional biopsies or MRI of the prostate. He would prefer to have an MRI first before additional biopsies. (August 26, 2019)     Mr. Polanco is in for six-month follow-up of chronic PSA elevation. He had his MRI of the prostate done on October 1 at [facility]. This revealed only PI-rads 2 lesion and additional biopsies were not needed. He is stable with voiding. Had a PSA done today that is pending. He's had no further rectal bleeding. Weight is exactly the same as was 6 months ago. No new health issues. (April 20, 2020)     Mr. Polanco is in for six-month follow-up of chronic PSA elevation. Had MRI of the prostate last year and did not reveal suspicious lesions. He is stable with voiding in general and has no worsening bladder outlet obstructive symptoms. He feels he does not need anything to help with voiding. Stable overall and has had a good 6 months. (October 27, 2020)  ------------------------------------------------------------------------------------------------------------------------------------------------------------     PSA Results:  Past PSA Results   PSA was 4.870 on October 27, 2020, Past PSA Results   PSA was 3.750 on August 24, 2018, Past PSA Results   PSA was 3.520 on February 14, 2018  PSA was 4.570 on 8/14/2017  4.090 on May 9, 2017  4.770 on February 7, 2017  2.540 on December 15, 2015  2.490 on December 15, 2014  3.110 on June 8, 2014  -------------------------------------------------------------------------------------------------------------------------------------------------------------------------------------------     Mr. Polanco" is in for six-month follow-up of chronic PSA elevation.  He is doing fine clinically with no worsening bladder outlet obstructive symptoms.  PSA pending when he was here.  One previous negative biopsy in 1 MRI that did not reveal suspicious lesions.  PSA returned after he left and is up slightly to 5.490. (April 27, 2021)     PSA was 5.490 on April 27, 2021  PSA was 5.010 on November 1, 2021   PSA was 6.120 on May 2, 2022  PSA was 4.870 on November 8, 2022     Mr. Polanco is in for 6 month follow-up of PSA elevation.  He has had a good 6 months with no worsening bladder outlet obstructive symptoms.  He has had no new health issues that he is aware of.  He had his PSA drawn and results are pending.(November 1, 2021)     Mr. Polanco is in for his 6 monthFollow-up of PSA elevation.  PSA drawn earlier today and is pending.  He has had a reasonably good 6 months except when he aspirated some black mold causing acute pulmonary problems.  That problem is better now.  No new  issues.  Voiding as well as he did last time he was here. (May 2, 2022)    Mr. Polanco is in for 6 month follow-up of chronic PSA elevation.  Doing okay clinically with no worsening bladder outlet obstructive symptoms and no new health issues.  3 lb weight gain.  PSA drawn and pending. [November 8, 2022]       Review of Systems      Objective:      Physical Exam  Constitutional:       Appearance: Normal appearance. He is normal weight.   Genitourinary:     Prostate: Normal.      Rectum: Normal.      Comments: Prostate gland is estimated 2530 g fairly symmetrical but may be a little more firm on the right side  Neurological:      Mental Status: He is alert.   Psychiatric:         Mood and Affect: Mood normal.         Behavior: Behavior normal.     Urinalysis revealed only occasional pus cells and occasional epithelial cells.  Dipstick negative with pH 6.0 and specific gravity 1.010  Assessment:       Problem List Items Addressed This Visit     None  Visit Diagnoses       Elevated PSA    -  Primary    Relevant Orders    PSA, Total (Diagnostic)    Benign prostatic hyperplasia with urinary obstruction        Chronic prostatitis        Erectile dysfunction, unspecified erectile dysfunction type                Plan:         Patient doing okay clinically with no worsening bladder outlet obstructive symptoms etc..  PSA returned after patient left and is satisfactory at 4.870.  He will be notified of results by telephone.  Six month appointment with PSA.

## 2022-11-08 NOTE — PROGRESS NOTES
Tosha Wilson DNP, LIT    [address] [place] [address]     PATIENT NAME: Neville Polanco  : 1960  DATE: 22  MRN: [id number]      Billing Provider: Tosha Wilson DNP, FNP  Level of Service:   Patient PCP Information       Provider PCP Type    Fidelia Mclean MD General            Reason for Visit / Chief Complaint: DOT physical  (Patient is here today for a DOT physical /)       Update PCP  Update Chief Complaint         History of Present Illness / Problem Focused Workflow     Neville Polanco presents to the clinic with DOT physical  (Patient is here today for a DOT physical /)     Pt here for DOT. Denies any medical history and denies taking any routine medications. Does see Dr. June for prostate issues.       Review of Systems     Review of Systems   Constitutional:  Negative for activity change and appetite change.   HENT:  Negative for dental problem, ear pain, sinus pressure/congestion and sore throat.    Respiratory:  Negative for cough, chest tightness and shortness of breath.    Cardiovascular:  Negative for chest pain and palpitations.   Gastrointestinal:  Negative for abdominal pain.   Genitourinary:  Negative for bladder incontinence and dysuria.   Musculoskeletal:  Negative for arthralgias.   Integumentary:  Negative for rash.   Neurological:  Negative for dizziness, weakness and numbness.      Medical / Social / Family History     Past Medical History:   Diagnosis Date    Benign prostatic hyperplasia with lower urinary tract symptoms     Chronic prostatitis     Elevated PSA     Male erectile dysfunction, unspecified        History reviewed. No pertinent surgical history.    Social History  Mr. Neville Polanco  reports that he has quit smoking. He has never used smokeless tobacco. He reports that he does not drink alcohol and does not use drugs.    Family History  Mr. Neville Polanco's family history includes Aneurysm in his mother; COPD in  his father.    Medications and Allergies     Medications  No outpatient medications have been marked as taking for the 11/8/22 encounter (Office Visit) with Tosha Wilson, PRISCILLA, FNP.       Allergies  Review of patient's allergies indicates:  No Known Allergies    Physical Examination     Vitals:    11/08/22 0830   BP: 118/80   BP Location: Left arm   Patient Position: Sitting   Pulse: 83   Resp: 18   SpO2: 95%   Weight: 88.5 kg (195 lb)   Height: 6' (1.829 m)     Physical Exam  Vitals and nursing note reviewed.   Constitutional:       General: He is not in acute distress.     Appearance: He is normal weight.   HENT:      Mouth/Throat:      Mouth: Mucous membranes are moist.   Eyes:      Pupils: Pupils are equal, round, and reactive to light.   Cardiovascular:      Rate and Rhythm: Normal rate and regular rhythm.      Pulses: Normal pulses.      Heart sounds: No murmur heard.  Pulmonary:      Effort: Pulmonary effort is normal. No respiratory distress.      Breath sounds: Normal breath sounds. No wheezing, rhonchi or rales.   Chest:      Chest wall: No tenderness.   Abdominal:      General: Bowel sounds are normal. There is no distension.      Palpations: Abdomen is soft.      Tenderness: There is no abdominal tenderness. There is no right CVA tenderness, left CVA tenderness, guarding or rebound.   Musculoskeletal:         General: No swelling or tenderness. Normal range of motion.      Cervical back: Normal range of motion and neck supple.      Right lower leg: No edema.      Left lower leg: No edema.   Skin:     General: Skin is warm and dry.   Neurological:      General: No focal deficit present.      Mental Status: He is alert and oriented to person, place, and time.   Psychiatric:         Mood and Affect: Mood normal.         Behavior: Behavior normal.         Thought Content: Thought content normal.         Judgment: Judgment normal.        Assessment and Plan (including Health Maintenance)      Problem List   Smart Sets  Document Outside    :    Plan:         Health Maintenance Due   Topic Date Due    Hepatitis C Screening  Never done    COVID-19 Vaccine (1) Never done    HIV Screening  Never done    TETANUS VACCINE  Never done    Shingles Vaccine (1 of 2) Never done    Influenza Vaccine (1) Never done       Problem List Items Addressed This Visit    None  Visit Diagnoses       Encounter for other administrative examinations    -  Primary          Encounter for other administrative examinations     Health Maintenance Topics with due status: Not Due       Topic Last Completion Date    Colorectal Cancer Screening 10/07/2019    Lipid Panel 05/02/2022         Future Appointments   Date Time Provider Department Center   11/8/2022 10:45 AM Vazquez June Jr., MD [facility]   5/19/2023 10:15 [facility]        Follow up if symptoms worsen or fail to improve.     Signature:  Tosha Wilson DNP, FNP  [address] [place] [address]  Phone #: [phone]  Fax #: [phone]    Date of encounter: 11/8/22    Patient Instructions   Follow up in 2 years for DOT.      Dr. Hamlin reviewing records for Brisa Wilson NP.   I have reviewed the encounter and agree with the assessment and plan.   Nick Hamlin MD

## 2022-11-09 NOTE — PATIENT INSTRUCTIONS
Patient doing okay clinically with no worsening bladder outlet obstructive symptoms etc..  PSA returned after patient left and is satisfactory at 4.870.  He will be notified of results by telephone.  Six month appointment with PSA.

## 2023-02-03 ENCOUNTER — OFFICE VISIT (OUTPATIENT)
Dept: FAMILY MEDICINE | Facility: CLINIC | Age: 63
End: 2023-02-03
Payer: COMMERCIAL

## 2023-02-03 VITALS
BODY MASS INDEX: 26.68 KG/M2 | TEMPERATURE: 98 F | SYSTOLIC BLOOD PRESSURE: 136 MMHG | RESPIRATION RATE: 17 BRPM | HEART RATE: 66 BPM | OXYGEN SATURATION: 99 % | HEIGHT: 72 IN | WEIGHT: 197 LBS | DIASTOLIC BLOOD PRESSURE: 74 MMHG

## 2023-02-03 DIAGNOSIS — R73.01 IMPAIRED FASTING GLUCOSE: ICD-10-CM

## 2023-02-03 DIAGNOSIS — R53.83 FATIGUE, UNSPECIFIED TYPE: ICD-10-CM

## 2023-02-03 DIAGNOSIS — E78.2 MIXED HYPERLIPIDEMIA: Primary | ICD-10-CM

## 2023-02-03 DIAGNOSIS — R06.83 SNORING: ICD-10-CM

## 2023-02-03 DIAGNOSIS — R03.0 ELEVATED BLOOD PRESSURE READING: ICD-10-CM

## 2023-02-03 LAB
ALBUMIN SERPL BCP-MCNC: 4 G/DL (ref 3.5–5)
ALBUMIN/GLOB SERPL: 1 {RATIO}
ALP SERPL-CCNC: 105 U/L (ref 45–115)
ALT SERPL W P-5'-P-CCNC: 26 U/L (ref 16–61)
ANION GAP SERPL CALCULATED.3IONS-SCNC: 9 MMOL/L (ref 7–16)
AST SERPL W P-5'-P-CCNC: 24 U/L (ref 15–37)
BILIRUB SERPL-MCNC: 0.3 MG/DL (ref ?–1.2)
BUN SERPL-MCNC: 19 MG/DL (ref 7–18)
BUN/CREAT SERPL: 19 (ref 6–20)
CALCIUM SERPL-MCNC: 9.5 MG/DL (ref 8.5–10.1)
CHLORIDE SERPL-SCNC: 106 MMOL/L (ref 98–107)
CHOLEST SERPL-MCNC: 208 MG/DL (ref 0–200)
CHOLEST/HDLC SERPL: 4.5 {RATIO}
CO2 SERPL-SCNC: 30 MMOL/L (ref 21–32)
CREAT SERPL-MCNC: 1.02 MG/DL (ref 0.7–1.3)
CREAT UR-MCNC: 46 MG/DL (ref 39–259)
EGFR (NO RACE VARIABLE) (RUSH/TITUS): 84 ML/MIN/1.73M²
EST. AVERAGE GLUCOSE BLD GHB EST-MCNC: 90 MG/DL
GLOBULIN SER-MCNC: 3.9 G/DL (ref 2–4)
GLUCOSE SERPL-MCNC: 106 MG/DL (ref 74–106)
HBA1C MFR BLD HPLC: 5.3 % (ref 4.5–6.6)
HDLC SERPL-MCNC: 46 MG/DL (ref 40–60)
LDLC SERPL CALC-MCNC: 144 MG/DL
LDLC/HDLC SERPL: 3.1 {RATIO}
MICROALBUMIN UR-MCNC: <0.5 MG/DL (ref 0–2.8)
MICROALBUMIN/CREAT RATIO PNL UR: <10.9 MG/G (ref 0–30)
NONHDLC SERPL-MCNC: 162 MG/DL
POTASSIUM SERPL-SCNC: 4.2 MMOL/L (ref 3.5–5.1)
PROT SERPL-MCNC: 7.9 G/DL (ref 6.4–8.2)
SODIUM SERPL-SCNC: 141 MMOL/L (ref 136–145)
TRIGL SERPL-MCNC: 90 MG/DL (ref 35–150)
VLDLC SERPL-MCNC: 18 MG/DL

## 2023-02-03 PROCEDURE — 1160F PR REVIEW ALL MEDS BY PRESCRIBER/CLIN PHARMACIST DOCUMENTED: ICD-10-PCS | Mod: ,,, | Performed by: FAMILY MEDICINE

## 2023-02-03 PROCEDURE — 1160F RVW MEDS BY RX/DR IN RCRD: CPT | Mod: ,,, | Performed by: FAMILY MEDICINE

## 2023-02-03 PROCEDURE — 82570 ASSAY OF URINE CREATININE: CPT | Mod: ,,, | Performed by: CLINICAL MEDICAL LABORATORY

## 2023-02-03 PROCEDURE — 83036 HEMOGLOBIN GLYCOSYLATED A1C: CPT | Mod: ,,, | Performed by: CLINICAL MEDICAL LABORATORY

## 2023-02-03 PROCEDURE — 99214 PR OFFICE/OUTPT VISIT, EST, LEVL IV, 30-39 MIN: ICD-10-PCS | Mod: ,,, | Performed by: FAMILY MEDICINE

## 2023-02-03 PROCEDURE — 3044F HG A1C LEVEL LT 7.0%: CPT | Mod: ,,, | Performed by: FAMILY MEDICINE

## 2023-02-03 PROCEDURE — 83036 HEMOGLOBIN A1C: ICD-10-PCS | Mod: ,,, | Performed by: CLINICAL MEDICAL LABORATORY

## 2023-02-03 PROCEDURE — 3008F BODY MASS INDEX DOCD: CPT | Mod: ,,, | Performed by: FAMILY MEDICINE

## 2023-02-03 PROCEDURE — 80061 LIPID PANEL: CPT | Mod: ,,, | Performed by: CLINICAL MEDICAL LABORATORY

## 2023-02-03 PROCEDURE — 82043 UR ALBUMIN QUANTITATIVE: CPT | Mod: ,,, | Performed by: CLINICAL MEDICAL LABORATORY

## 2023-02-03 PROCEDURE — 3078F PR MOST RECENT DIASTOLIC BLOOD PRESSURE < 80 MM HG: ICD-10-PCS | Mod: ,,, | Performed by: FAMILY MEDICINE

## 2023-02-03 PROCEDURE — 82570 MICROALBUMIN / CREATININE RATIO URINE: ICD-10-PCS | Mod: ,,, | Performed by: CLINICAL MEDICAL LABORATORY

## 2023-02-03 PROCEDURE — 1159F PR MEDICATION LIST DOCUMENTED IN MEDICAL RECORD: ICD-10-PCS | Mod: ,,, | Performed by: FAMILY MEDICINE

## 2023-02-03 PROCEDURE — 80061 LIPID PANEL: ICD-10-PCS | Mod: ,,, | Performed by: CLINICAL MEDICAL LABORATORY

## 2023-02-03 PROCEDURE — 3075F SYST BP GE 130 - 139MM HG: CPT | Mod: ,,, | Performed by: FAMILY MEDICINE

## 2023-02-03 PROCEDURE — 3008F PR BODY MASS INDEX (BMI) DOCUMENTED: ICD-10-PCS | Mod: ,,, | Performed by: FAMILY MEDICINE

## 2023-02-03 PROCEDURE — 82043 MICROALBUMIN / CREATININE RATIO URINE: ICD-10-PCS | Mod: ,,, | Performed by: CLINICAL MEDICAL LABORATORY

## 2023-02-03 PROCEDURE — 80053 COMPREHEN METABOLIC PANEL: CPT | Mod: ,,, | Performed by: CLINICAL MEDICAL LABORATORY

## 2023-02-03 PROCEDURE — 1159F MED LIST DOCD IN RCRD: CPT | Mod: ,,, | Performed by: FAMILY MEDICINE

## 2023-02-03 PROCEDURE — 3044F PR MOST RECENT HEMOGLOBIN A1C LEVEL <7.0%: ICD-10-PCS | Mod: ,,, | Performed by: FAMILY MEDICINE

## 2023-02-03 PROCEDURE — 3078F DIAST BP <80 MM HG: CPT | Mod: ,,, | Performed by: FAMILY MEDICINE

## 2023-02-03 PROCEDURE — 80053 COMPREHENSIVE METABOLIC PANEL: ICD-10-PCS | Mod: ,,, | Performed by: CLINICAL MEDICAL LABORATORY

## 2023-02-03 PROCEDURE — 3075F PR MOST RECENT SYSTOLIC BLOOD PRESS GE 130-139MM HG: ICD-10-PCS | Mod: ,,, | Performed by: FAMILY MEDICINE

## 2023-02-03 PROCEDURE — 99214 OFFICE O/P EST MOD 30 MIN: CPT | Mod: ,,, | Performed by: FAMILY MEDICINE

## 2023-02-03 NOTE — PROGRESS NOTES
Subjective     Patient ID: Neville Polanco is a 62 y.o. male.    Chief Complaint: Color Me Healthy (Patient in for color me healthy #1 of 2 for HTN, Glucose, and Cholesterol.)    Patient is here for a [employer] Healthy you for elevated blood pressure and glucose and hyperlipidemia. Patient complains of fatigue. He reports that if he is still such as watching TV he falls asleep. Patient complains of snoring.   Review of Systems   Constitutional:  Negative for fatigue and fever.   HENT:  Negative for ear pain, postnasal drip, rhinorrhea and sinus pressure/congestion.    Respiratory:  Negative for cough and shortness of breath.    Cardiovascular:  Negative for chest pain.   Gastrointestinal:  Negative for abdominal pain, diarrhea, nausea and vomiting.   Genitourinary:  Negative for dysuria.   Neurological:  Negative for headaches.     Tobacco Use: Medium Risk    Smoking Tobacco Use: Former    Smokeless Tobacco Use: Never    Passive Exposure: Not on file     Review of patient's allergies indicates:  No Known Allergies  Current Outpatient Medications   Medication Instructions    sildenafil (REVATIO) 20 mg Tab TAKE 2-5 TABLETS BY MOUTH 1 HOUR PRIOR TO INTERCOURSE (DO NOT TAKE WITH NITROGLYCERIN, NITRATES)     There are no discontinued medications.    Past Medical History:   Diagnosis Date    Benign prostatic hyperplasia with lower urinary tract symptoms     Chronic prostatitis     Elevated PSA     Male erectile dysfunction, unspecified      Health Maintenance Topics with due status: Not Due       Topic Last Completion Date    Colorectal Cancer Screening 10/07/2019    Hemoglobin A1c (Prediabetes) 02/03/2023    Lipid Panel 02/03/2023       There is no immunization history on file for this patient.    Objective     Body mass index is 26.71 kg/m².  Wt Readings from Last 3 Encounters:   02/03/23 89.4 kg (197 lb)   11/08/22 88.5 kg (195 lb)   11/08/22 88.5 kg (195 lb)     Ht Readings from Last 3 Encounters:   02/03/23 6'  "0.01" (1.829 m)   11/08/22 6' (1.829 m)   11/08/22 6' (1.829 m)     BP Readings from Last 3 Encounters:   02/03/23 136/74   11/08/22 (!) 144/69   11/08/22 118/80     Temp Readings from Last 3 Encounters:   02/03/23 97.7 °F (36.5 °C) (Oral)   05/02/22 97 °F (36.1 °C) (Oral)   02/16/22 99.3 °F (37.4 °C) (Oral)     Pulse Readings from Last 3 Encounters:   02/03/23 66   11/08/22 61   11/08/22 83     Resp Readings from Last 3 Encounters:   02/03/23 17   11/08/22 18   05/02/22 18     PF Readings from Last 3 Encounters:   No data found for PF       Physical Exam  HENT:      Head: Normocephalic and atraumatic.      Mouth/Throat:      Pharynx: Oropharynx is clear.   Cardiovascular:      Rate and Rhythm: Normal rate and regular rhythm.   Pulmonary:      Effort: Pulmonary effort is normal.      Breath sounds: Normal breath sounds.   Neurological:      Mental Status: He is alert and oriented to person, place, and time.   Psychiatric:         Mood and Affect: Mood normal.         Behavior: Behavior normal.       Assessment and Plan     Problem List Items Addressed This Visit    None  Visit Diagnoses       Mixed hyperlipidemia    -  Primary    Relevant Orders    Lipid Panel (Completed)    Comprehensive Metabolic Panel (Completed)    Elevated blood pressure reading        Relevant Orders    Comprehensive Metabolic Panel (Completed)    Impaired fasting glucose        Relevant Orders    Hemoglobin A1C (Completed)    Microalbumin/Creatinine Ratio, Urine (Completed)    Snoring        Relevant Orders    Ambulatory referral/consult to Sleep Disorders    Fatigue, unspecified type        Relevant Orders    Ambulatory referral/consult to Sleep Disorders            Plan:       I have reviewed the medications, allergies, and problem list.     Goal Actions:    What type of visit is the patient here for today?: Color Me Healthy  Which Color Me Healthy program is the patient enrolling in?: Blood Pressure (Hypertension)  Is this a Wellness " Follow Up?: No  What is your overall wellness goal? (select at least one): Improve overall health  Choose 3: Nutrition, Lifestyle, Exercise  Lifestyle Actions : Take medications as prescribed  Nurtrition Actions: Eat a well-balanced diet  Exercise Actions: Recommend physical activity 30 minutes per day 3-5 times/week

## 2023-02-03 NOTE — PATIENT INSTRUCTIONS
"Patient Education       High Blood Pressure in Adults   The Basics   Written by the doctors and editors at [facility]   What is high blood pressure? -- High blood pressure is a condition that puts you at risk for heart attack, stroke, and kidney disease. It does not usually cause symptoms. But it can be serious.  When your doctor or nurse tells you your blood pressure, they will say 2 numbers. For instance, your doctor or nurse might say that your blood pressure is "130 over 80." The top number is the pressure inside your arteries when your heart is akash. The bottom number is the pressure inside your arteries when your heart is relaxed.  "Elevated blood pressure" is a term doctors or nurses use as a warning. People with elevated blood pressure do not yet have high blood pressure. But their blood pressure is not as low as it should be for good health.  Many experts define high, elevated, and normal blood pressure as follows:  High - Top number of 130 or above and/or bottom number of 80 or above  Elevated - Top number between 120 and 129 and bottom number of 79 or below  Normal - Top number of 119 or below and bottom number of 79 or below  This information is also in the table (table 1).   How can I lower my blood pressure? -- If your doctor or nurse has prescribed blood pressure medicine, the most important thing you can do is to take it. If it causes side effects, do not just stop taking it. Instead, talk to your doctor or nurse about the problems it causes. They might be able to lower your dose or switch you to another medicine. If cost is a problem, mention that too. They might be able to put you on a less expensive medicine. Taking your blood pressure medicine can keep you from having a heart attack or stroke, and it can save your life!  Can I do anything on my own? -- You have a lot of control over your blood pressure. To lower it:  Lose weight (if you are overweight)  Choose a diet low in fat and rich in " "fruits, vegetables, and low-fat dairy products  Reduce the amount of salt you eat  Do something active for at least 30 minutes a day on most days of the week  Cut down on alcohol (if you drink more than 2 alcoholic drinks per day)  It's also a good idea to get a home blood pressure meter. People who check their own blood pressure at home do better at keeping it low and can sometimes even reduce the amount of medicine they take.  All topics are updated as new evidence becomes available and our peer review process is complete.  This topic retrieved from [employer] on: Sep 21, 2021.  Topic 31656 Version 15.0  Release: 29.4.2 - C29.263  © 2021 UpToDate, Inc. and/or its affiliates. All rights reserved.  table 1: Definition of normal and high blood pressure  Level  Top number  Bottom number    High 130 or above 80 or above   Elevated 120 to 129 79 or below   Normal 119 or below 79 or below   These definitions are from the American College of Cardiology/American Heart Association. Other expert groups might use slightly different definitions.  "Elevated blood pressure" is a term doctor or nurses use as a warning. It means you do not yet have high blood pressure, but your blood pressure is not as low as it should be for good health.  Graphic 63339 Version 6.0  Consumer Information Use and Disclaimer   This information is not specific medical advice and does not replace information you receive from your health care provider. This is only a brief summary of general information. It does NOT include all information about conditions, illnesses, injuries, tests, procedures, treatments, therapies, discharge instructions or life-style choices that may apply to you. You must talk with your health care provider for complete information about your health and treatment options. This information should not be used to decide whether or not to accept your health care provider's advice, instructions or recommendations. Only your health care " provider has the knowledge and training to provide advice that is right for you. The use of this information is governed by the [employer] End User License Agreement, available at https://[email or web address]/en/solutions/[employer]/about/fredi.The use of [employer] content is governed by the [employer] Terms of Use. ©2021 UpToDate, Inc. All rights reserved.  Copyright   © 2021 UpToDate, Inc. and/or its affiliates. All rights reserved.    Patient Education       Diet and Health   The Basics   Written by the doctors and editors at [facility]   Why is it important to eat a healthy diet? -- It's important to eat a healthy diet because eating the right foods can keep you healthy now and later on in life.  Which foods are especially healthy? -- Foods that are especially healthy include:  Fruits and vegetables - Eating a diet with lots of fruits and vegetables can help prevent heart disease and strokes. It might also help prevent certain types of cancers. Try to eat fruits and vegetables at each meal and also for snacks. If you don't have fresh fruits and vegetables available, you can eat frozen or canned ones instead. Doctors recommend eating at least 2 1/2 servings of vegetables and 2 servings of fruits each day.  Foods with fiber - Eating foods with a lot of fiber can help prevent heart disease and strokes. If you have type 2 diabetes, it can also help control your blood sugar. Foods that have a lot of fiber include vegetables, fruits, beans, nuts, oatmeal, and whole grain breads and cereals. You can tell how much fiber is in a food by reading the nutrition label (figure 1). Doctors recommend eating 25 to 36 grams of fiber each day.  Foods with folate (also called folic acid) - Folate is a vitamin that is important for pregnant people, since it helps prevent certain birth defects. Anyone who could get pregnant should get at least 400 micrograms of folic acid daily, whether or not they are actively trying to get pregnant. Folate  "is found in many breakfast cereals, oranges, orange juice, and green leafy vegetables.  Foods with calcium and vitamin D - Babies, children, and adults need calcium and vitamin D to help keep their bones strong. Adults also need calcium and vitamin D to help prevent osteoporosis. Osteoporosis is a condition that causes bones to get "thin" and break more easily than usual. Different foods and drinks have calcium and vitamin D in them (figure 2). People who don't get enough calcium and vitamin D in their diet might need to take a supplement.  Foods with protein - Protein helps your muscles stay strong. Healthy foods with a lot of protein include chicken, fish, eggs, beans, nuts, and soy products. Red meat also has a lot of protein, but it also contains fats, which can be unhealthy.  Some experts recommend a "Mediterranean diet." This involves eating a lot of fruits, vegetables, nuts, whole grains, and olive oil. It also includes some fish, poultry, and dairy products, but not a lot of red meat. Eating this way can help your overall health, and might even lower your risk of having a stroke.  What foods should I avoid or limit? -- To eat a healthy diet, there are some things you should avoid or limit. They include:   Fats - There are different types of fats. Some types of fats are better for your body than others.  Trans fats are especially unhealthy. They are found in margarines, many fast foods, and some store-bought baked goods. Trans fats can raise your cholesterol level and your increase your chance of getting heart disease. You should avoid eating foods with these types of fats.  The type of "polyunsaturated" fats found in fish seems to be healthy and can reduce your chance of getting heart disease. Other polyunsaturated fats might also be good for your health. When you cook, it's best to use oils with healthier fats, such as olive oil and canola oil.  Sugar - To have a healthy diet, it's important to limit or " "avoid sugar, sweets, and refined grains. Refined grains are found in white bread, white rice, most forms of pasta, and most packaged "snack" foods. Whole grains, such as whole-wheat bread and brown rice, have more fiber and are better for your health.  Avoiding sugar-sweetened beverages, like soda and sports drinks, can also help improve your health.  Red meat - Studies have shown that eating a lot of red meat can increase your risk of certain health problems, including heart disease and cancer. You should limit the amount of red meat that you eat.  Can I drink alcohol as part of a healthy diet? -- People who drink a small amount of alcohol each day might have a lower chance of getting heart disease. But drinking alcohol can lead to problems. For example, it can raise a person's chances of getting liver disease and certain types of cancers. In women, even 1 drink a day can increase the risk of getting breast cancer.  Most doctors recommend that adult women not have more than 1 drink a day and that adult men not have more than 2 drinks a day. The limits are different because most women's bodies take longer to break down alcohol.  How many calories do I need each day? -- The number of calories you need each day depends on your weight, height, age, sex, and how active you are.  Your doctor or nurse can tell you how many calories you should eat each day. If you are trying to lose weight, you should eat fewer calories each day.  What if I have questions? -- If you have questions about which foods you should or should not eat, ask your doctor or nurse. The right diet for you will depend, in part, on your health and any medical conditions you have.  All topics are updated as new evidence becomes available and our peer review process is complete.  This topic retrieved from [email or web address] on: Sep 21, 2021.  Topic 80461 Version 20.0  Release: 29.4.2 - C29.263  © 2021 UpToDate, Inc. and/or its affiliates. All rights " "reserved.  figure 1: Nutrition label - fiber     This is an example of a nutrition label. To figure out how much fiber is in a food, look for the line that says "Dietary Fiber." It's also important to look at the serving size. This food has 7 grams of fiber in each serving, and each serving is 1 cup.  Graphic 64567 Version 7.0    figure 2: Foods and drinks with calcium and vitamin D     Foods rich in calcium include ice cream, soy milk, breads, kale, broccoli, milk, cheese, cottage cheese, almonds, yogurt, ready-to-eat cereals, beans, and tofu. Foods rich in vitamin D include milk, fortified plant-based "milks" (soy, almond), canned tuna fish, cod liver oil, yogurt, ready-to-eat-cereals, cooked salmon, canned sardines, mackerel, and eggs. Some of these foods are rich in both.  Graphic 95701 Version 4.0    Consumer Information Use and Disclaimer   This information is not specific medical advice and does not replace information you receive from your health care provider. This is only a brief summary of general information. It does NOT include all information about conditions, illnesses, injuries, tests, procedures, treatments, therapies, discharge instructions or life-style choices that may apply to you. You must talk with your health care provider for complete information about your health and treatment options. This information should not be used to decide whether or not to accept your health care provider's advice, instructions or recommendations. Only your health care provider has the knowledge and training to provide advice that is right for you. The use of this information is governed by the [employer] End User License Agreement, available at https://www.[email or web address] [facility].[employer]/en/solutions/[employer]/about/fredi.The use of [employer] content is governed by the [employer] Terms of Use. ©2021 UpToDate, Inc. All rights reserved.  Copyright   © 2021 UpToDate, Inc. and/or its affiliates. All rights reserved.    "

## 2023-02-08 NOTE — PROGRESS NOTES
LDL has gone up slightly. Diet and exercise. Will repeat in 6 months. Other labs within acceptable limits.

## 2023-03-14 ENCOUNTER — TELEPHONE (OUTPATIENT)
Dept: SLEEP MEDICINE | Facility: CLINIC | Age: 63
End: 2023-03-14
Payer: COMMERCIAL

## 2023-03-15 ENCOUNTER — OFFICE VISIT (OUTPATIENT)
Dept: SLEEP MEDICINE | Facility: CLINIC | Age: 63
End: 2023-03-15
Payer: COMMERCIAL

## 2023-03-15 VITALS
HEART RATE: 63 BPM | DIASTOLIC BLOOD PRESSURE: 77 MMHG | WEIGHT: 197.38 LBS | SYSTOLIC BLOOD PRESSURE: 132 MMHG | OXYGEN SATURATION: 98 % | HEIGHT: 72 IN | BODY MASS INDEX: 26.73 KG/M2

## 2023-03-15 DIAGNOSIS — G47.30 SLEEP APNEA, UNSPECIFIED TYPE: Primary | ICD-10-CM

## 2023-03-15 DIAGNOSIS — G47.19 OTHER HYPERSOMNIA: ICD-10-CM

## 2023-03-15 DIAGNOSIS — R53.83 FATIGUE, UNSPECIFIED TYPE: ICD-10-CM

## 2023-03-15 DIAGNOSIS — G47.8 OTHER SLEEP DISORDERS: ICD-10-CM

## 2023-03-15 PROCEDURE — 99203 PR OFFICE/OUTPT VISIT, NEW, LEVL III, 30-44 MIN: ICD-10-PCS | Mod: S$PBB,,, | Performed by: FAMILY MEDICINE

## 2023-03-15 PROCEDURE — 1160F PR REVIEW ALL MEDS BY PRESCRIBER/CLIN PHARMACIST DOCUMENTED: ICD-10-PCS | Mod: ,,, | Performed by: FAMILY MEDICINE

## 2023-03-15 PROCEDURE — 3044F PR MOST RECENT HEMOGLOBIN A1C LEVEL <7.0%: ICD-10-PCS | Mod: ,,, | Performed by: FAMILY MEDICINE

## 2023-03-15 PROCEDURE — 3078F PR MOST RECENT DIASTOLIC BLOOD PRESSURE < 80 MM HG: ICD-10-PCS | Mod: ,,, | Performed by: FAMILY MEDICINE

## 2023-03-15 PROCEDURE — 3075F PR MOST RECENT SYSTOLIC BLOOD PRESS GE 130-139MM HG: ICD-10-PCS | Mod: ,,, | Performed by: FAMILY MEDICINE

## 2023-03-15 PROCEDURE — 1160F RVW MEDS BY RX/DR IN RCRD: CPT | Mod: ,,, | Performed by: FAMILY MEDICINE

## 2023-03-15 PROCEDURE — 1159F MED LIST DOCD IN RCRD: CPT | Mod: ,,, | Performed by: FAMILY MEDICINE

## 2023-03-15 PROCEDURE — 3008F BODY MASS INDEX DOCD: CPT | Mod: ,,, | Performed by: FAMILY MEDICINE

## 2023-03-15 PROCEDURE — 3078F DIAST BP <80 MM HG: CPT | Mod: ,,, | Performed by: FAMILY MEDICINE

## 2023-03-15 PROCEDURE — 1159F PR MEDICATION LIST DOCUMENTED IN MEDICAL RECORD: ICD-10-PCS | Mod: ,,, | Performed by: FAMILY MEDICINE

## 2023-03-15 PROCEDURE — 3044F HG A1C LEVEL LT 7.0%: CPT | Mod: ,,, | Performed by: FAMILY MEDICINE

## 2023-03-15 PROCEDURE — 3075F SYST BP GE 130 - 139MM HG: CPT | Mod: ,,, | Performed by: FAMILY MEDICINE

## 2023-03-15 PROCEDURE — 99203 OFFICE O/P NEW LOW 30 MIN: CPT | Mod: S$PBB,,, | Performed by: FAMILY MEDICINE

## 2023-03-15 PROCEDURE — 3008F PR BODY MASS INDEX (BMI) DOCUMENTED: ICD-10-PCS | Mod: ,,, | Performed by: FAMILY MEDICINE

## 2023-03-15 PROCEDURE — 99214 OFFICE O/P EST MOD 30 MIN: CPT | Mod: PBBFAC,PN | Performed by: FAMILY MEDICINE

## 2023-03-15 RX ORDER — HYDROGEN PEROXIDE 3 %
20 SOLUTION, NON-ORAL MISCELLANEOUS
COMMUNITY
End: 2023-11-17 | Stop reason: SDUPTHER

## 2023-03-15 RX ORDER — NAPROXEN SODIUM 220 MG
220 TABLET ORAL
COMMUNITY
End: 2023-05-25 | Stop reason: SDUPTHER

## 2023-03-15 NOTE — PROGRESS NOTES
Patient presents to clinic for initial eval. ESS is 14. Patient snores, EDF, occasionally has restless legs, GERD occasionally, sometimes wakes up short of breath, up to bathroom 1 time per night, denies accident or near accident due to drowsy driving, feels refreshed after a short nap.

## 2023-03-15 NOTE — PROGRESS NOTES
Subjective:       Patient ID: Neville Polanco is a 62 y.o. male.    Chief Complaint: Sleep Apnea, Snoring, and Fatigue    HPI  Review of Systems      Objective:      Physical Exam    Assessment:       Problem List Items Addressed This Visit    None  Visit Diagnoses       Sleep apnea, unspecified type    -  Primary    Other hypersomnia        Other sleep disorders        Fatigue, unspecified type                  Plan:       ***

## 2023-03-15 NOTE — PROGRESS NOTES
Subjective:       Patient ID: Neville Polanco is a 62 y.o. male.    Chief Complaint: Sleep Apnea, Snoring, and Fatigue    Patient complains of excessive daytime sleepiness, loud snoring and witnessed apneas patient states this has been a problem for the last 15-20 years.  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 having no difficulty going to sleep and up for the day at 4:30 a.m. waking unrefreshed.  The patient states he will be awake at least 4 times a night with only 1 time to the bathroom and has difficulty going back to sleep.  The patient does have episodes of waking with gasping as well as morning headaches and night sweats.    Review of Systems   Constitutional:  Positive for fatigue.   Respiratory:  Positive for apnea and choking. Negative for shortness of breath and wheezing.    Neurological:  Positive for headaches. Negative for memory loss.   Psychiatric/Behavioral:  Positive for sleep disturbance.  Denies cataplexy, sleep paralysis, hypnagogic or hypnopompic hallucinations.  Admits to restless legs, night sweats, morning headaches, waking with choking and gasping and difficulty with maintaining sleep.      Objective:      Physical Exam  Vitals reviewed.   Constitutional:       General: He is not in acute distress.  HENT:      Head: Normocephalic.      Comments: Mallampati Class III  No Macroglossia, No Micrognathia, and No Retrognathia observed.        Nose: Nose normal. No septal deviation.      Mouth/Throat:      Tongue: No lesions. Tongue does not deviate from midline.      Pharynx: Uvula midline.   Neck:      Thyroid: No thyroid mass, thyromegaly or thyroid tenderness.      Vascular: No carotid bruit.      Comments: Thyroid midline without masses or enlargement.  Cardiovascular:      Rate and Rhythm: Normal rate and regular rhythm.      Heart sounds: Normal heart sounds. No murmur heard.    No friction rub. No gallop.   Pulmonary:      Effort: Pulmonary effort is normal.       Breath sounds: Normal breath sounds.   Musculoskeletal:      Cervical back: Neck supple.      Right lower leg: No edema.      Left lower leg: No edema.   Lymphadenopathy:      Cervical:      Right cervical: No superficial or posterior cervical adenopathy.     Left cervical: No superficial or posterior cervical adenopathy.   Skin:     General: Skin is warm and dry.   Neurological:      Mental Status: He is alert and oriented to person, place, and time.   Psychiatric:         Attention and Perception: Attention normal.         Mood and Affect: Mood and affect normal.         Speech: Speech normal.         Behavior: Behavior normal. Behavior is cooperative.       Assessment:       Problem List Items Addressed This Visit    None  Visit Diagnoses       Sleep apnea, unspecified type    -  Primary    Other hypersomnia        Other sleep disorders        Fatigue, unspecified type                  Plan:       1. Polysomnography  2. Caution while operating machinery

## 2023-05-08 ENCOUNTER — OFFICE VISIT (OUTPATIENT)
Dept: UROLOGY | Facility: CLINIC | Age: 63
End: 2023-05-08
Payer: COMMERCIAL

## 2023-05-08 VITALS
WEIGHT: 199 LBS | HEIGHT: 72 IN | DIASTOLIC BLOOD PRESSURE: 77 MMHG | HEART RATE: 66 BPM | BODY MASS INDEX: 26.95 KG/M2 | SYSTOLIC BLOOD PRESSURE: 141 MMHG

## 2023-05-08 DIAGNOSIS — N41.1 CHRONIC PROSTATITIS: ICD-10-CM

## 2023-05-08 DIAGNOSIS — N52.9 ERECTILE DYSFUNCTION, UNSPECIFIED ERECTILE DYSFUNCTION TYPE: ICD-10-CM

## 2023-05-08 DIAGNOSIS — R97.20 ELEVATED PSA: Primary | ICD-10-CM

## 2023-05-08 DIAGNOSIS — N40.1 BENIGN PROSTATIC HYPERPLASIA WITH URINARY OBSTRUCTION: ICD-10-CM

## 2023-05-08 DIAGNOSIS — N13.8 BENIGN PROSTATIC HYPERPLASIA WITH URINARY OBSTRUCTION: ICD-10-CM

## 2023-05-08 PROCEDURE — 3044F PR MOST RECENT HEMOGLOBIN A1C LEVEL <7.0%: ICD-10-PCS | Mod: ,,, | Performed by: UROLOGY

## 2023-05-08 PROCEDURE — 3008F BODY MASS INDEX DOCD: CPT | Mod: ,,, | Performed by: UROLOGY

## 2023-05-08 PROCEDURE — 1160F RVW MEDS BY RX/DR IN RCRD: CPT | Mod: ,,, | Performed by: UROLOGY

## 2023-05-08 PROCEDURE — 3008F PR BODY MASS INDEX (BMI) DOCUMENTED: ICD-10-PCS | Mod: ,,, | Performed by: UROLOGY

## 2023-05-08 PROCEDURE — 99214 OFFICE O/P EST MOD 30 MIN: CPT | Mod: PBBFAC | Performed by: UROLOGY

## 2023-05-08 PROCEDURE — 99213 PR OFFICE/OUTPT VISIT, EST, LEVL III, 20-29 MIN: ICD-10-PCS | Mod: S$PBB,,, | Performed by: UROLOGY

## 2023-05-08 PROCEDURE — 1160F PR REVIEW ALL MEDS BY PRESCRIBER/CLIN PHARMACIST DOCUMENTED: ICD-10-PCS | Mod: ,,, | Performed by: UROLOGY

## 2023-05-08 PROCEDURE — 3078F PR MOST RECENT DIASTOLIC BLOOD PRESSURE < 80 MM HG: ICD-10-PCS | Mod: ,,, | Performed by: UROLOGY

## 2023-05-08 PROCEDURE — 99213 OFFICE O/P EST LOW 20 MIN: CPT | Mod: S$PBB,,, | Performed by: UROLOGY

## 2023-05-08 PROCEDURE — 1159F MED LIST DOCD IN RCRD: CPT | Mod: ,,, | Performed by: UROLOGY

## 2023-05-08 PROCEDURE — 3044F HG A1C LEVEL LT 7.0%: CPT | Mod: ,,, | Performed by: UROLOGY

## 2023-05-08 PROCEDURE — 3077F SYST BP >= 140 MM HG: CPT | Mod: ,,, | Performed by: UROLOGY

## 2023-05-08 PROCEDURE — 1159F PR MEDICATION LIST DOCUMENTED IN MEDICAL RECORD: ICD-10-PCS | Mod: ,,, | Performed by: UROLOGY

## 2023-05-08 PROCEDURE — 3077F PR MOST RECENT SYSTOLIC BLOOD PRESSURE >= 140 MM HG: ICD-10-PCS | Mod: ,,, | Performed by: UROLOGY

## 2023-05-08 PROCEDURE — 3078F DIAST BP <80 MM HG: CPT | Mod: ,,, | Performed by: UROLOGY

## 2023-05-08 RX ORDER — SILDENAFIL 100 MG/1
100 TABLET, FILM COATED ORAL
Qty: 20 TABLET | Refills: 11 | Status: SHIPPED | OUTPATIENT
Start: 2023-05-08 | End: 2024-05-07

## 2023-05-08 RX ORDER — CHLORHEXIDINE GLUCONATE ORAL RINSE 1.2 MG/ML
SOLUTION DENTAL
COMMUNITY
Start: 2023-04-03

## 2023-05-08 NOTE — PROGRESS NOTES
Subjective     Patient ID: Neville Polanco is a 62 y.o. male.    Chief Complaint: Follow-up (Six month PSA and office visit. )    Chief Complaint: Follow-up (Six month PSA and office visit. )        History of Present Illness  Mr. Polanco is 53 years old. He is a  with silicosis of the right lung. Sent to me by Dr. Sanjana Mclean for high normal PSA and low normal testosterone values. Main complaint is lack of energy. This is something that seems to have started only in the relatively recent past. PSA was the first one he ever had done and was on June 8; it was right at the upper end of normal at 3.110, which is right at the upper end of normal. His testosterone was 335 which is lower end of normal. Neither study has been repeated for documentation. There is no history of urinary tract infections, stones, hematuria, or any other urinary tract problems. To his knowledge there  is no family history of prostate cancer.  (August 6, 2014)     Mr. Polanco is brought back today for 6 month PSA since the PSA he had in June was in the very high normal range. We did repeat his testosterone on the above visit and it is normal at 304. The previous one had been 335. These are in the low normal range but are normal. No improvement in energy level. No new medical problems.  (December 15, 2014)     Mr. Polanco is basically the same as he was one year ago. In for his yearly checkup and his PSA is stable at 2.54. Testosterone is still technically normal at 278.1. He  has no significant energy level and loss of libido. He has very minimal sexual function. He would like to try PDE 5 inhibitors to help.  (December 15, 2015)     Mr. Polanco has had Good year in general and is voiding as well as a year ago. PSA has jumped up to 4.770 which is surprising because he is having no symptoms of prostate infection, UTIs, or any other increased problems.  (February 7, 2017)     Mr. Polanco returned for follow-up today with PSA. Was  treated with Cipro on last visit but was only able to take about half of that. He developed some rash on the parts of his body exposed to sunlight and may have had a photosensitivity related to taking medication. I don't think he had a true reaction to the drug. He only took about half. Doing okay now and PSA down slightly but still elevated for age. Voiding is stable. He did not get sildenafil filled because his drugstore apparently did not have it. Needs new prescription he can take around and get the best price.  (May 9, 2017)     Mr. oPlanco was brought back because of PSA elevation. PSA today back up to 4.570. Clinically he is doing okay without any increase in voiding difficulty but definitely has elevated PSA. Probably needs to consider biopsies. (August 14 2017)     Mr. Polanco  returned today for follow-up of his chronically elevated PSA. He had biopsies on last visit and it showed acute and chronic prostatitis without any significant dysplasia, PIN, other premalignant changes. He did try the generic sildenafi and it did work satisfactorily for him. Doing okay clinically without any worsening bladder outlet obstructive symptoms or other difficulty. Patient waited to L5 hours for PSA to be returned and it never returned while he was here. Subsequently returned after he left as 3.520. Patient notified by phone.  (February 14, 2018)     Mr. Polanco is in for six-month follow-up of chronic PSA elevation. His PSA was actually down slightly last time. He's had a good 6 months with no increasing problems with voiding or other difficulties. PSA is pending.  (August 24, 2018)     Mr. Polanco has had a good 6 months overall as far as health problems are concerned. PSA back up slightly to 4.21 but has been riding a tractor for the last couple weeks working very hard. Certainly that could be a factor. No worsening bladder outlet obstructive symptoms or other new problems.  (February 25, 2019)     Mr. Polanco is in for  "six-month follow-up of PSA elevation. He had biopsies of his prostate 2 years ago. No increased trouble with voiding. Overall has had a good 6 months. A little bit of bleeding from his rectum recently and feels he might have a "polyp" but really has never had a colonoscopy and needs to have that checked because of the bleeding. PSA returned as 5.670. Has been 2 years since his biopsies. He probably needs to have additional biopsies or MRI of the prostate. He would prefer to have an MRI first before additional biopsies. (August 26, 2019)     Mr. Polanco is in for six-month follow-up of chronic PSA elevation. He had his MRI of the prostate done on October 1 at [facility]. This revealed only PI-rads 2 lesion and additional biopsies were not needed. He is stable with voiding. Had a PSA done today that is pending. He's had no further rectal bleeding. Weight is exactly the same as was 6 months ago. No new health issues. (April 20, 2020)     Mr. Polanco is in for six-month follow-up of chronic PSA elevation. Had MRI of the prostate last year and did not reveal suspicious lesions. He is stable with voiding in general and has no worsening bladder outlet obstructive symptoms. He feels he does not need anything to help with voiding. Stable overall and has had a good 6 months. (October 27, 2020)  ------------------------------------------------------------------------------------------------------------------------------------------------------------     PSA Results:  Past PSA Results   PSA was 4.870 on October 27, 2020, Past PSA Results   PSA was 3.750 on August 24, 2018, Past PSA Results   PSA was 3.520 on February 14, 2018  PSA was 4.570 on 8/14/2017  4.090 on May 9, 2017  4.770 on February 7, 2017  2.540 on December 15, 2015  2.490 on December 15, 2014  3.110 on June 8, " 2014  -------------------------------------------------------------------------------------------------------------------------------------------------------------------------------------------     Mr. Polanco is in for six-month follow-up of chronic PSA elevation.  He is doing fine clinically with no worsening bladder outlet obstructive symptoms.  PSA pending when he was here.  One previous negative biopsy in 1 MRI that did not reveal suspicious lesions.  PSA returned after he left and is up slightly to 5.490. (April 27, 2021)     PSA was 5.490 on April 27, 2021  PSA was 5.010 on November 1, 2021   PSA was 6.120 on May 2, 2022  PSA was 4.870 on November 8, 2022  PSA was 6.130 on May 8, 2023     Mr. Polanco is in for 6 month follow-up of PSA elevation.  He has had a good 6 months with no worsening bladder outlet obstructive symptoms.  He has had no new health issues that he is aware of.  He had his PSA drawn and results are pending.(November 1, 2021)     Mr. Polanco is in for his 6 monthFollow-up of PSA elevation.  PSA drawn earlier today and is pending.  He has had a reasonably good 6 months except when he aspirated some black mold causing acute pulmonary problems.  That problem is better now.  No new  issues.  Voiding as well as he did last time he was here. (May 2, 2022)     Mr. Polanco is in for 6 month follow-up of chronic PSA elevation.  Doing okay clinically with no worsening bladder outlet obstructive symptoms and no new health issues.  3 lb weight gain.  PSA drawn and pending. [November 8, 2022]      Mr. Polanco is in for follow-up of his chronic PSA elevation.  No worsening of bladder outlet obstructive symptoms.  He has regained weight he had lost last time.  He has had no new health issues.  PSA reported just as patient was leaving office and it was reported to him as 6.130.  That is essentially the same as it was 1 year ago.  [May 8, 2023]    Review of Systems       Objective     Physical  Exam  Constitutional:       Appearance: Normal appearance. He is normal weight.   Genitourinary:     Prostate: Normal.      Rectum: Normal.      Comments: Prostate 25 g symmetrical and benign  Neurological:      Mental Status: He is alert.   Psychiatric:         Mood and Affect: Mood normal.         Behavior: Behavior normal.      Urinalysis revealed occasional pus cells.  The dipstick negative pH 6.0 and specific gravity 1.030.  Assessment and Plan     Problem List Items Addressed This Visit    None  Visit Diagnoses       Elevated PSA    -  Primary    Relevant Orders    PSA, Total (Diagnostic)    Benign prostatic hyperplasia with urinary obstruction        Chronic prostatitis        Erectile dysfunction, unspecified erectile dysfunction type             PSA of 6.130 reported to patient.  100 mg sildenafil prescription given to patient.  6 month appointment with PSA or sooner if needed.

## 2023-05-09 NOTE — PATIENT INSTRUCTIONS
PSA of 6.130 reported to patient.  100 mg sildenafil prescription submitted and given to patient.  6 month appointment with PSA or sooner if needed.

## 2023-05-15 ENCOUNTER — PROCEDURE VISIT (OUTPATIENT)
Dept: SLEEP MEDICINE | Facility: HOSPITAL | Age: 63
End: 2023-05-15
Payer: COMMERCIAL

## 2023-05-15 DIAGNOSIS — G47.8 OTHER SLEEP DISORDERS: ICD-10-CM

## 2023-05-15 DIAGNOSIS — G47.30 SLEEP APNEA, UNSPECIFIED TYPE: Primary | ICD-10-CM

## 2023-05-15 DIAGNOSIS — G47.19 OTHER HYPERSOMNIA: ICD-10-CM

## 2023-05-15 PROCEDURE — 95810 POLYSOM 6/> YRS 4/> PARAM: CPT | Mod: PN

## 2023-05-23 PROCEDURE — 95810 POLYSOM 6/> YRS 4/> PARAM: CPT | Mod: 26,,, | Performed by: FAMILY MEDICINE

## 2023-05-23 PROCEDURE — 95810 PR POLYSOMNOGRAPHY, 4 OR MORE: ICD-10-PCS | Mod: 26,,, | Performed by: FAMILY MEDICINE

## 2023-05-25 ENCOUNTER — OFFICE VISIT (OUTPATIENT)
Dept: FAMILY MEDICINE | Facility: CLINIC | Age: 63
End: 2023-05-25
Payer: COMMERCIAL

## 2023-05-25 VITALS
HEART RATE: 59 BPM | BODY MASS INDEX: 26.68 KG/M2 | WEIGHT: 197 LBS | HEIGHT: 72 IN | TEMPERATURE: 97 F | OXYGEN SATURATION: 99 % | SYSTOLIC BLOOD PRESSURE: 130 MMHG | DIASTOLIC BLOOD PRESSURE: 71 MMHG | RESPIRATION RATE: 18 BRPM

## 2023-05-25 DIAGNOSIS — Z00.01 ANNUAL VISIT FOR GENERAL ADULT MEDICAL EXAMINATION WITH ABNORMAL FINDINGS: Primary | ICD-10-CM

## 2023-05-25 DIAGNOSIS — Z13.220 SCREENING FOR LIPOID DISORDERS: ICD-10-CM

## 2023-05-25 DIAGNOSIS — Z13.1 SCREENING FOR DIABETES MELLITUS: ICD-10-CM

## 2023-05-25 DIAGNOSIS — E78.2 MIXED HYPERLIPIDEMIA: ICD-10-CM

## 2023-05-25 LAB
CHOLEST SERPL-MCNC: 214 MG/DL (ref 0–200)
CHOLEST/HDLC SERPL: 4 {RATIO}
GLUCOSE SERPL-MCNC: 102 MG/DL (ref 74–106)
HDLC SERPL-MCNC: 54 MG/DL (ref 40–60)
LDLC SERPL CALC-MCNC: 148 MG/DL
LDLC/HDLC SERPL: 2.7 {RATIO}
NONHDLC SERPL-MCNC: 160 MG/DL
TRIGL SERPL-MCNC: 61 MG/DL (ref 35–150)
VLDLC SERPL-MCNC: 12 MG/DL

## 2023-05-25 PROCEDURE — 80061 LIPID PANEL: ICD-10-PCS | Mod: ,,, | Performed by: CLINICAL MEDICAL LABORATORY

## 2023-05-25 PROCEDURE — 3078F PR MOST RECENT DIASTOLIC BLOOD PRESSURE < 80 MM HG: ICD-10-PCS | Mod: ,,, | Performed by: FAMILY MEDICINE

## 2023-05-25 PROCEDURE — 1159F PR MEDICATION LIST DOCUMENTED IN MEDICAL RECORD: ICD-10-PCS | Mod: ,,, | Performed by: FAMILY MEDICINE

## 2023-05-25 PROCEDURE — 82947 GLUCOSE, RANDOM: ICD-10-PCS | Mod: ,,, | Performed by: CLINICAL MEDICAL LABORATORY

## 2023-05-25 PROCEDURE — 80061 LIPID PANEL: CPT | Mod: ,,, | Performed by: CLINICAL MEDICAL LABORATORY

## 2023-05-25 PROCEDURE — 82947 ASSAY GLUCOSE BLOOD QUANT: CPT | Mod: ,,, | Performed by: CLINICAL MEDICAL LABORATORY

## 2023-05-25 PROCEDURE — 1160F PR REVIEW ALL MEDS BY PRESCRIBER/CLIN PHARMACIST DOCUMENTED: ICD-10-PCS | Mod: ,,, | Performed by: FAMILY MEDICINE

## 2023-05-25 PROCEDURE — 3078F DIAST BP <80 MM HG: CPT | Mod: ,,, | Performed by: FAMILY MEDICINE

## 2023-05-25 PROCEDURE — 3008F BODY MASS INDEX DOCD: CPT | Mod: ,,, | Performed by: FAMILY MEDICINE

## 2023-05-25 PROCEDURE — 3008F PR BODY MASS INDEX (BMI) DOCUMENTED: ICD-10-PCS | Mod: ,,, | Performed by: FAMILY MEDICINE

## 2023-05-25 PROCEDURE — 99396 PREV VISIT EST AGE 40-64: CPT | Mod: ,,, | Performed by: FAMILY MEDICINE

## 2023-05-25 PROCEDURE — 1160F RVW MEDS BY RX/DR IN RCRD: CPT | Mod: ,,, | Performed by: FAMILY MEDICINE

## 2023-05-25 PROCEDURE — 3075F PR MOST RECENT SYSTOLIC BLOOD PRESS GE 130-139MM HG: ICD-10-PCS | Mod: ,,, | Performed by: FAMILY MEDICINE

## 2023-05-25 PROCEDURE — 3075F SYST BP GE 130 - 139MM HG: CPT | Mod: ,,, | Performed by: FAMILY MEDICINE

## 2023-05-25 PROCEDURE — 99396 PR PREVENTIVE VISIT,EST,40-64: ICD-10-PCS | Mod: ,,, | Performed by: FAMILY MEDICINE

## 2023-05-25 PROCEDURE — 3044F PR MOST RECENT HEMOGLOBIN A1C LEVEL <7.0%: ICD-10-PCS | Mod: ,,, | Performed by: FAMILY MEDICINE

## 2023-05-25 PROCEDURE — 3044F HG A1C LEVEL LT 7.0%: CPT | Mod: ,,, | Performed by: FAMILY MEDICINE

## 2023-05-25 PROCEDURE — 1159F MED LIST DOCD IN RCRD: CPT | Mod: ,,, | Performed by: FAMILY MEDICINE

## 2023-05-25 RX ORDER — NAPROXEN SODIUM 220 MG
220 TABLET ORAL EVERY 12 HOURS PRN
Qty: 180 TABLET | Refills: 1 | Status: SHIPPED | OUTPATIENT
Start: 2023-05-25

## 2023-05-25 RX ORDER — ACETAMINOPHEN AND CODEINE PHOSPHATE 300; 30 MG/1; MG/1
1 TABLET ORAL EVERY 4 HOURS PRN
COMMUNITY
Start: 2023-03-08

## 2023-05-25 NOTE — PROGRESS NOTES
Subjective     Patient ID: Neville Polanco is a 62 y.o. male.    Chief Complaint: Healthy You (Patient stated he is here for a [facility] [place] Healthy You Visit.)    Patient is here for a [employer] [facility] healthy you. Patient needs refills.         Review of Systems   Constitutional:  Negative for fatigue and fever.   HENT:  Negative for ear pain, postnasal drip, rhinorrhea and sinus pressure/congestion.    Respiratory:  Negative for cough and shortness of breath.    Cardiovascular:  Negative for chest pain.   Gastrointestinal:  Negative for abdominal pain, diarrhea, nausea and vomiting.   Genitourinary:  Negative for dysuria.   Neurological:  Negative for headaches.     Tobacco Use: Medium Risk    Smoking Tobacco Use: Former    Smokeless Tobacco Use: Never    Passive Exposure: Past     Review of patient's allergies indicates:  No Known Allergies  Current Outpatient Medications   Medication Instructions    acetaminophen-codeine 300-30mg (TYLENOL #3) 300-30 mg Tab 1 tablet, Oral, Every 4 hours PRN    chlorhexidine (PERIDEX) 0.12 % solution SWISH 1/2 OZ FOR 30 SECONDS TWICE DAILY    esomeprazole (NEXIUM) 20 mg, Oral, Before breakfast    naproxen sodium (ANAPROX) 220 mg, Oral, Every 12 hours PRN    sildenafil (REVATIO) 20 mg Tab TAKE 2-5 TABLETS BY MOUTH 1 HOUR PRIOR TO INTERCOURSE (DO NOT TAKE WITH NITROGLYCERIN, NITRATES)    sildenafiL (VIAGRA) 100 mg, Oral, As needed (PRN), 1 hour before S.I.     Medications Discontinued During This Encounter   Medication Reason    naproxen sodium (ANAPROX) 220 MG tablet Reorder       Past Medical History:   Diagnosis Date    Benign prostatic hyperplasia with lower urinary tract symptoms     Chronic prostatitis     Elevated PSA     Male erectile dysfunction, unspecified      Health Maintenance Topics with due status: Not Due       Topic Last Completion Date    Colorectal Cancer Screening 10/07/2019    Hemoglobin A1c (Prediabetes) 02/03/2023    Lipid Panel 05/25/2023       There is  no immunization history on file for this patient.    Objective     Body mass index is 26.72 kg/m².  Wt Readings from Last 3 Encounters:   05/25/23 89.4 kg (197 lb)   05/08/23 90.3 kg (199 lb)   03/15/23 89.5 kg (197 lb 6.4 oz)     Ht Readings from Last 3 Encounters:   05/25/23 6' (1.829 m)   05/08/23 6' (1.829 m)   03/15/23 6' (1.829 m)     BP Readings from Last 3 Encounters:   05/25/23 130/71   05/08/23 (!) 141/77   03/15/23 132/77     Temp Readings from Last 3 Encounters:   05/25/23 97.1 °F (36.2 °C) (Oral)   02/03/23 97.7 °F (36.5 °C) (Oral)   05/02/22 97 °F (36.1 °C) (Oral)     Pulse Readings from Last 3 Encounters:   05/25/23 (!) 59   05/08/23 66   03/15/23 63     Resp Readings from Last 3 Encounters:   05/25/23 18   02/03/23 17   11/08/22 18     PF Readings from Last 3 Encounters:   No data found for PF       Physical Exam  HENT:      Head: Normocephalic and atraumatic.   Cardiovascular:      Rate and Rhythm: Normal rate and regular rhythm.   Pulmonary:      Effort: Pulmonary effort is normal.      Breath sounds: Normal breath sounds.   Neurological:      Mental Status: He is alert and oriented to person, place, and time.   Psychiatric:         Mood and Affect: Mood normal.         Behavior: Behavior normal.       Assessment and Plan     Problem List Items Addressed This Visit    None  Visit Diagnoses       Annual visit for general adult medical examination with abnormal findings    -  Primary    Relevant Medications    naproxen sodium (ANAPROX) 220 MG tablet    Screening for diabetes mellitus        Relevant Orders    Glucose, Random (Completed)    Screening for lipoid disorders        Relevant Orders    Lipid Panel (Completed)    Mixed hyperlipidemia                Plan: Refill medications. Labs done. Educational material given.      I have reviewed the medications, allergies, and problem list.     Goal Actions:    What type of visit is the patient here for today?: Healthy You  Does the patient consent to  enroll in [employer]?: Yes  Is this a Wellness Follow Up?: No  What is your overall wellness goal? (select at least one): Lifestyle modifications, Lose weight, Understand disease process, Improve overall health  Choose 3: Biometric, Lifestyle, Nutrition  Biometric Actions: Attend regularly scheduled office visits  Lifestyle Actions : Take medications as prescribed  Nurtrition Actions: Eat a well-balanced diet

## 2023-05-25 NOTE — PATIENT INSTRUCTIONS
"Patient Education       High Cholesterol   The Basics   Written by the doctors and editors at [facility]   What is cholesterol? -- Cholesterol is a substance that is found in the blood. Everyone has some. It is needed for good health. The problem is, people sometimes have too much cholesterol. Compared with people with normal cholesterol, people with high cholesterol have a higher risk of heart attacks, strokes, and other health problems. The higher your cholesterol, the higher your risk of these problems.  Are there different types of cholesterol? -- Yes, there are a few different types. If you get a cholesterol test, you might hear your doctor or nurse talk about:  Total cholesterol  LDL cholesterol - Some people call this the "bad" cholesterol. That's because having high LDL levels raises your risk of heart attacks, strokes, and other health problems.  HDL cholesterol - Some people call this the "good" cholesterol. That's because people with high HDL levels tend to have a lower risk of heart attacks, strokes, and other health problems.   Non-HDL cholesterol - Non-HDL cholesterol is your total cholesterol minus your HDL cholesterol.  Triglycerides - Triglycerides are not cholesterol. They are another type of fat. But they often get measured when cholesterol is measured. (Having high triglycerides also seems to increase the risk of heart attacks and strokes.)  What should my numbers be? -- Ask your doctor or nurse what your numbers should be. Different people need different goals. (If you live outside the [place], see the table (table 1)). In general, people who do not already have heart disease should aim for:  Total cholesterol below 200  LDL cholesterol below 130 - or much lower, if they are at risk of heart attacks or strokes  HDL cholesterol above 60  Non-HDL cholesterol below 160 - or lower, if they are at risk of heart attacks or strokes  Triglycerides below 150  Keep in mind, though, that many people " "who cannot meet these goals still have a low risk of heart attacks and strokes.  What should I do if my doctor tells me I have high cholesterol? -- Ask your doctor what your overall risk of heart attacks and strokes is. High cholesterol, by itself, is not always a reason to worry. Having high cholesterol is just one of many things that can increase your risk of heart attacks and strokes. Other factors that increase your risk include:  Cigarette smoking  High blood pressure  Having a parent, sister, or brother who got heart disease at a young age - Young, in this case, means younger than 55 for men and younger than 65 for women.  A diet that is not heart healthy - A "heart-healthy" diet includes lots of fruits and vegetables, fiber, and healthy fats (like those found in fish and certain oils). It also means limiting sugar and unhealthy fats.  Older age  If you are at high risk of heart attacks and strokes, having high cholesterol is a problem. On the other hand, if you are at low risk, having high cholesterol might not lead to treatment.  Should I take medicine to lower cholesterol? -- Not everyone who has high cholesterol needs medicines. Your doctor or nurse will decide if you need them based on your age, family history, and other health concerns.  You should probably take a cholesterol-lowering medicine called a statin if you:  Already had a heart attack or stroke  Have known heart disease  Have diabetes  Have a condition called peripheral artery disease, which makes it painful to walk, and happens when the arteries in your legs get clogged with fatty deposits  Have an abdominal aortic aneurysm, which is a widening of the main artery in the belly  Most people with any of the conditions listed above should take a statin no matter what their cholesterol level is. If your doctor or nurse puts you on a statin, stay on it. The medicine might not make you feel any different. But it can help prevent heart attacks, " strokes, and death.  Can I lower my cholesterol without medicines? -- Yes, you can lower your cholesterol some by:  Avoiding red meat, butter, fried foods, cheese, and other foods that have a lot of saturated fat  Losing weight (if you are overweight)  Being more active  Even if these steps do little to change your cholesterol, they can improve your health in many ways.  All topics are updated as new evidence becomes available and our peer review process is complete.  This topic retrieved from [employer] on: Sep 21, 2021.  Topic 10479 Version 19.0  Release: 29.4.2 - C29.263  © 2021 UpToDate, Inc. and/or its affiliates. All rights reserved.  table 1: Cholesterol and triglyceride measurements in the [place] and elsewhere     Measurement used within the [place] Milligrams/deciliter (mg/dL)  Measurement used most places outside the [place] Millimoles/liter (mmol/Liter)     Level to aim for  Level to aim for    Total cholesterol  Below 200 Below 5.17   LDL cholesterol  Below 130 - or much lower if at risk of heart attack and stroke Below 3.36 - or much lower if at risk of heart attack and stroke   HDL cholesterol  Above 60 Above 1.55   Triglycerides  Below 150 Below 1.7   Cholesterol is measured differently in the [place] than it is in most other countries. This table shows values used within and outside the [place]. It includes the cholesterol and triglyceride levels that most people who do not have heart disease should aim for.  Graphic 30867 Version 3.0  Consumer Information Use and Disclaimer   This information is not specific medical advice and does not replace information you receive from your health care provider. This is only a brief summary of general information. It does NOT include all information about conditions, illnesses, injuries, tests, procedures, treatments, therapies, discharge instructions or life-style choices that may apply to you. You must talk with your health care  provider for complete information about your health and treatment options. This information should not be used to decide whether or not to accept your health care provider's advice, instructions or recommendations. Only your health care provider has the knowledge and training to provide advice that is right for you. The use of this information is governed by the "[employer] End User License Agreement, available at https://www.[employer]/en/solutions/RunTitle/about/fredi.The use of [employer] content is governed by the [employer] Terms of Use. ©2021 UpToDate, Inc. All rights reserved.  Copyright   © 2021 UpToDate, Inc. and/or its affiliates. All rights reserved.    Patient Education       Diet and Health   The Basics   Written by the doctors and editors at [facility]   Why is it important to eat a healthy diet? -- It's important to eat a healthy diet because eating the right foods can keep you healthy now and later on in life.  Which foods are especially healthy? -- Foods that are especially healthy include:  Fruits and vegetables - Eating a diet with lots of fruits and vegetables can help prevent heart disease and strokes. It might also help prevent certain types of cancers. Try to eat fruits and vegetables at each meal and also for snacks. If you don't have fresh fruits and vegetables available, you can eat frozen or canned ones instead. Doctors recommend eating at least 2 1/2 servings of vegetables and 2 servings of fruits each day.  Foods with fiber - Eating foods with a lot of fiber can help prevent heart disease and strokes. If you have type 2 diabetes, it can also help control your blood sugar. Foods that have a lot of fiber include vegetables, fruits, beans, nuts, oatmeal, and whole grain breads and cereals. You can tell how much fiber is in a food by reading the nutrition label (figure 1). Doctors recommend eating 25 to 36 grams of fiber each day.  Foods with folate (also called folic acid) - Folate is a  "vitamin that is important for pregnant people, since it helps prevent certain birth defects. Anyone who could get pregnant should get at least 400 micrograms of folic acid daily, whether or not they are actively trying to get pregnant. Folate is found in many breakfast cereals, oranges, orange juice, and green leafy vegetables.  Foods with calcium and vitamin D - Babies, children, and adults need calcium and vitamin D to help keep their bones strong. Adults also need calcium and vitamin D to help prevent osteoporosis. Osteoporosis is a condition that causes bones to get "thin" and break more easily than usual. Different foods and drinks have calcium and vitamin D in them (figure 2). People who don't get enough calcium and vitamin D in their diet might need to take a supplement.  Foods with protein - Protein helps your muscles stay strong. Healthy foods with a lot of protein include chicken, fish, eggs, beans, nuts, and soy products. Red meat also has a lot of protein, but it also contains fats, which can be unhealthy.  Some experts recommend a "Mediterranean diet." This involves eating a lot of fruits, vegetables, nuts, whole grains, and olive oil. It also includes some fish, poultry, and dairy products, but not a lot of red meat. Eating this way can help your overall health, and might even lower your risk of having a stroke.  What foods should I avoid or limit? -- To eat a healthy diet, there are some things you should avoid or limit. They include:   Fats - There are different types of fats. Some types of fats are better for your body than others.  Trans fats are especially unhealthy. They are found in margarines, many fast foods, and some store-bought baked goods. Trans fats can raise your cholesterol level and your increase your chance of getting heart disease. You should avoid eating foods with these types of fats.  The type of "polyunsaturated" fats found in fish seems to be healthy and can reduce your chance " "of getting heart disease. Other polyunsaturated fats might also be good for your health. When you cook, it's best to use oils with healthier fats, such as olive oil and canola oil.  Sugar - To have a healthy diet, it's important to limit or avoid sugar, sweets, and refined grains. Refined grains are found in white bread, white rice, most forms of pasta, and most packaged "snack" foods. Whole grains, such as whole-wheat bread and brown rice, have more fiber and are better for your health.  Avoiding sugar-sweetened beverages, like soda and sports drinks, can also help improve your health.  Red meat - Studies have shown that eating a lot of red meat can increase your risk of certain health problems, including heart disease and cancer. You should limit the amount of red meat that you eat.  Can I drink alcohol as part of a healthy diet? -- People who drink a small amount of alcohol each day might have a lower chance of getting heart disease. But drinking alcohol can lead to problems. For example, it can raise a person's chances of getting liver disease and certain types of cancers. In women, even 1 drink a day can increase the risk of getting breast cancer.  Most doctors recommend that adult women not have more than 1 drink a day and that adult men not have more than 2 drinks a day. The limits are different because most women's bodies take longer to break down alcohol.  How many calories do I need each day? -- The number of calories you need each day depends on your weight, height, age, sex, and how active you are.  Your doctor or nurse can tell you how many calories you should eat each day. If you are trying to lose weight, you should eat fewer calories each day.  What if I have questions? -- If you have questions about which foods you should or should not eat, ask your doctor or nurse. The right diet for you will depend, in part, on your health and any medical conditions you have.  All topics are updated as new " "evidence becomes available and our peer review process is complete.  This topic retrieved from [employer] on: Sep 21, 2021.  Topic 77210 Version 20.0  Release: 29.4.2 - C29.263  © 2021 UpToDate, Inc. and/or its affiliates. All rights reserved.  figure 1: Nutrition label - fiber     This is an example of a nutrition label. To figure out how much fiber is in a food, look for the line that says "Dietary Fiber." It's also important to look at the serving size. This food has 7 grams of fiber in each serving, and each serving is 1 cup.  Graphic 89486 Version 7.0    figure 2: Foods and drinks with calcium and vitamin D     Foods rich in calcium include ice cream, soy milk, breads, kale, broccoli, milk, cheese, cottage cheese, almonds, yogurt, ready-to-eat cereals, beans, and tofu. Foods rich in vitamin D include milk, fortified plant-based "milks" (soy, almond), canned tuna fish, cod liver oil, yogurt, ready-to-eat-cereals, cooked salmon, canned sardines, mackerel, and eggs. Some of these foods are rich in both.  Graphic 03397 Version 4.0    Consumer Information Use and Disclaimer   This information is not specific medical advice and does not replace information you receive from your health care provider. This is only a brief summary of general information. It does NOT include all information about conditions, illnesses, injuries, tests, procedures, treatments, therapies, discharge instructions or life-style choices that may apply to you. You must talk with your health care provider for complete information about your health and treatment options. This information should not be used to decide whether or not to accept your health care provider's advice, instructions or recommendations. Only your health care provider has the knowledge and training to provide advice that is right for you. The use of this information is governed by the [employer] End User License Agreement, available at " https://www.[employer].com/en/solutions/lexicomp/about/fredi.The use of [employer] content is governed by the [employer] Terms of Use. ©2021 [employer] All rights reserved.  Copyright   © 2021 UpToDate, Inc. and/or its affiliates. All rights reserved.

## 2023-05-26 ENCOUNTER — PATIENT OUTREACH (OUTPATIENT)
Dept: ADMINISTRATIVE | Facility: HOSPITAL | Age: 63
End: 2023-05-26

## 2023-05-26 DIAGNOSIS — E78.2 MIXED HYPERLIPIDEMIA: Primary | ICD-10-CM

## 2023-05-26 RX ORDER — ATORVASTATIN CALCIUM 10 MG/1
10 TABLET, FILM COATED ORAL DAILY
Qty: 90 TABLET | Refills: 1 | Status: SHIPPED | OUTPATIENT
Start: 2023-05-26 | End: 2023-11-17 | Stop reason: SDUPTHER

## 2023-05-26 NOTE — PROGRESS NOTES
Post visit Population Health review of encounter with date of service  5/25 with Vinay.  All required HY components in encounter.    Added myself to reji

## 2023-06-12 ENCOUNTER — PATIENT OUTREACH (OUTPATIENT)
Dept: ADMINISTRATIVE | Facility: HOSPITAL | Age: 63
End: 2023-06-12

## 2023-06-12 NOTE — PROGRESS NOTES
Per [facility] website, insurance is active and patient is enrolled in [facility]:  [facility] for hyperlipidemia  Changed 11/2023 to [facility]

## 2023-08-01 ENCOUNTER — TELEPHONE (OUTPATIENT)
Dept: SLEEP MEDICINE | Facility: CLINIC | Age: 63
End: 2023-08-01
Payer: COMMERCIAL

## 2023-08-02 ENCOUNTER — OFFICE VISIT (OUTPATIENT)
Dept: SLEEP MEDICINE | Facility: CLINIC | Age: 63
End: 2023-08-02
Payer: COMMERCIAL

## 2023-08-02 VITALS
WEIGHT: 199.38 LBS | HEART RATE: 63 BPM | HEIGHT: 72 IN | SYSTOLIC BLOOD PRESSURE: 137 MMHG | BODY MASS INDEX: 27.01 KG/M2 | DIASTOLIC BLOOD PRESSURE: 81 MMHG | OXYGEN SATURATION: 96 %

## 2023-08-02 DIAGNOSIS — G47.19 OTHER HYPERSOMNIA: ICD-10-CM

## 2023-08-02 DIAGNOSIS — F51.12 BEHAVIORALLY INDUCED INSUFFICIENT SLEEP SYNDROME: Primary | ICD-10-CM

## 2023-08-02 PROCEDURE — 3079F PR MOST RECENT DIASTOLIC BLOOD PRESSURE 80-89 MM HG: ICD-10-PCS | Mod: ,,, | Performed by: FAMILY MEDICINE

## 2023-08-02 PROCEDURE — 1159F MED LIST DOCD IN RCRD: CPT | Mod: ,,, | Performed by: FAMILY MEDICINE

## 2023-08-02 PROCEDURE — 1160F PR REVIEW ALL MEDS BY PRESCRIBER/CLIN PHARMACIST DOCUMENTED: ICD-10-PCS | Mod: ,,, | Performed by: FAMILY MEDICINE

## 2023-08-02 PROCEDURE — 99213 PR OFFICE/OUTPT VISIT, EST, LEVL III, 20-29 MIN: ICD-10-PCS | Mod: S$PBB,,, | Performed by: FAMILY MEDICINE

## 2023-08-02 PROCEDURE — 99213 OFFICE O/P EST LOW 20 MIN: CPT | Mod: S$PBB,,, | Performed by: FAMILY MEDICINE

## 2023-08-02 PROCEDURE — 1159F PR MEDICATION LIST DOCUMENTED IN MEDICAL RECORD: ICD-10-PCS | Mod: ,,, | Performed by: FAMILY MEDICINE

## 2023-08-02 PROCEDURE — 3008F BODY MASS INDEX DOCD: CPT | Mod: ,,, | Performed by: FAMILY MEDICINE

## 2023-08-02 PROCEDURE — 3008F PR BODY MASS INDEX (BMI) DOCUMENTED: ICD-10-PCS | Mod: ,,, | Performed by: FAMILY MEDICINE

## 2023-08-02 PROCEDURE — 3044F PR MOST RECENT HEMOGLOBIN A1C LEVEL <7.0%: ICD-10-PCS | Mod: ,,, | Performed by: FAMILY MEDICINE

## 2023-08-02 PROCEDURE — 3044F HG A1C LEVEL LT 7.0%: CPT | Mod: ,,, | Performed by: FAMILY MEDICINE

## 2023-08-02 PROCEDURE — 3079F DIAST BP 80-89 MM HG: CPT | Mod: ,,, | Performed by: FAMILY MEDICINE

## 2023-08-02 PROCEDURE — 1160F RVW MEDS BY RX/DR IN RCRD: CPT | Mod: ,,, | Performed by: FAMILY MEDICINE

## 2023-08-02 PROCEDURE — 3075F SYST BP GE 130 - 139MM HG: CPT | Mod: ,,, | Performed by: FAMILY MEDICINE

## 2023-08-02 PROCEDURE — 3075F PR MOST RECENT SYSTOLIC BLOOD PRESS GE 130-139MM HG: ICD-10-PCS | Mod: ,,, | Performed by: FAMILY MEDICINE

## 2023-08-02 PROCEDURE — 99214 OFFICE O/P EST MOD 30 MIN: CPT | Mod: PBBFAC,PN | Performed by: FAMILY MEDICINE

## 2023-08-02 NOTE — PROGRESS NOTES
Subjective     Patient ID: Neville Polanco is a 62 y.o. male.    Chief Complaint: Fatigue    Patient follows up to discuss diagnostic polysomnography and review a 2 week sleep diary.  Patient's [place] score is 16.  The patient's diagnostic polysomnography revealed an AHI 1.4 with mild PLMS.  There were no arrhythmias and is lowest O2 saturation was 90%.  Patient's 2 week sleep diary revealed an average of 7 hours of sleep but caffeine consumption was noted throughout the day and into the night.  I discussed sleep hygiene with the patient and to avoid caffeine after lunch and give himself the opportunity to sleep 8 hours.  The patient states that he does take naps every day and that 15-20 minute naps are refreshing.  Patient states that he has always had excessive daytime sleepiness even as a teenager.  I discussed the possibility of narcolepsy and recommended a repeat polysomnography with an MSLT to follow.  The patient states that he will try avoiding the caffeine and increase in his hours of sleep to see if that resolves the issue of excessive daytime sleepiness.  I have advised the patient to take 15-20 minute naps at lunch and around 3:30-4 p.m. daily as well as avoid drowsy driving.      Review of Systems   Constitutional:  Positive for fatigue.        Negative EDS   Respiratory:  Negative for apnea.    Psychiatric/Behavioral:  Negative for sleep disturbance.           Objective     Physical Exam  Cardiovascular:      Rate and Rhythm: Normal rate and regular rhythm.      Heart sounds: No murmur heard.  Pulmonary:      Breath sounds: Normal breath sounds.   Skin:     General: Skin is warm and dry.   Neurological:      Mental Status: He is alert and oriented to person, place, and time.            Assessment and Plan     1. Behaviorally induced insufficient sleep syndrome    2. Other hypersomnia      1. Recommend MSLT  2. Caution while operating machinery especially avoid driving when drowsy.  3. Improve sleep  hygiene.  4. Follow-up with sleep clinic p.r.n.      Follow up if symptoms worsen or fail to improve.

## 2023-11-17 ENCOUNTER — OFFICE VISIT (OUTPATIENT)
Dept: FAMILY MEDICINE | Facility: CLINIC | Age: 63
End: 2023-11-17
Payer: COMMERCIAL

## 2023-11-17 VITALS
RESPIRATION RATE: 19 BRPM | BODY MASS INDEX: 26.01 KG/M2 | HEART RATE: 72 BPM | OXYGEN SATURATION: 98 % | SYSTOLIC BLOOD PRESSURE: 125 MMHG | TEMPERATURE: 98 F | DIASTOLIC BLOOD PRESSURE: 75 MMHG | HEIGHT: 72 IN | WEIGHT: 192 LBS

## 2023-11-17 DIAGNOSIS — K21.9 GASTROESOPHAGEAL REFLUX DISEASE, UNSPECIFIED WHETHER ESOPHAGITIS PRESENT: ICD-10-CM

## 2023-11-17 DIAGNOSIS — E78.2 MIXED HYPERLIPIDEMIA: Primary | ICD-10-CM

## 2023-11-17 LAB
ALBUMIN SERPL BCP-MCNC: 4 G/DL (ref 3.5–5)
ALBUMIN/GLOB SERPL: 1 {RATIO}
ALP SERPL-CCNC: 107 U/L (ref 45–115)
ALT SERPL W P-5'-P-CCNC: 35 U/L (ref 16–61)
ANION GAP SERPL CALCULATED.3IONS-SCNC: 7 MMOL/L (ref 7–16)
AST SERPL W P-5'-P-CCNC: 21 U/L (ref 15–37)
BILIRUB SERPL-MCNC: 0.3 MG/DL (ref ?–1.2)
BUN SERPL-MCNC: 22 MG/DL (ref 7–18)
BUN/CREAT SERPL: 20 (ref 6–20)
CALCIUM SERPL-MCNC: 9.8 MG/DL (ref 8.5–10.1)
CHLORIDE SERPL-SCNC: 106 MMOL/L (ref 98–107)
CHOLEST SERPL-MCNC: 181 MG/DL (ref 0–200)
CHOLEST/HDLC SERPL: 3.7 {RATIO}
CO2 SERPL-SCNC: 30 MMOL/L (ref 21–32)
CREAT SERPL-MCNC: 1.1 MG/DL (ref 0.7–1.3)
CREAT UR-MCNC: 147 MG/DL (ref 39–259)
EGFR (NO RACE VARIABLE) (RUSH/TITUS): 76 ML/MIN/1.73M2
GLOBULIN SER-MCNC: 4 G/DL (ref 2–4)
GLUCOSE SERPL-MCNC: 104 MG/DL (ref 74–106)
HDLC SERPL-MCNC: 49 MG/DL (ref 40–60)
LDLC SERPL CALC-MCNC: 118 MG/DL
LDLC/HDLC SERPL: 2.4 {RATIO}
MICROALBUMIN UR-MCNC: 0.7 MG/DL (ref 0–2.8)
MICROALBUMIN/CREAT RATIO PNL UR: 4.8 MG/G (ref 0–30)
NONHDLC SERPL-MCNC: 132 MG/DL
POTASSIUM SERPL-SCNC: 4.6 MMOL/L (ref 3.5–5.1)
PROT SERPL-MCNC: 8 G/DL (ref 6.4–8.2)
SODIUM SERPL-SCNC: 138 MMOL/L (ref 136–145)
TRIGL SERPL-MCNC: 72 MG/DL (ref 35–150)
VLDLC SERPL-MCNC: 14 MG/DL

## 2023-11-17 PROCEDURE — 82570 ASSAY OF URINE CREATININE: CPT | Mod: ,,, | Performed by: CLINICAL MEDICAL LABORATORY

## 2023-11-17 PROCEDURE — 80061 LIPID PANEL: ICD-10-PCS | Mod: ,,, | Performed by: CLINICAL MEDICAL LABORATORY

## 2023-11-17 PROCEDURE — 82570 MICROALBUMIN / CREATININE RATIO URINE: ICD-10-PCS | Mod: ,,, | Performed by: CLINICAL MEDICAL LABORATORY

## 2023-11-17 PROCEDURE — 99214 PR OFFICE/OUTPT VISIT, EST, LEVL IV, 30-39 MIN: ICD-10-PCS | Mod: ,,, | Performed by: FAMILY MEDICINE

## 2023-11-17 PROCEDURE — 3008F PR BODY MASS INDEX (BMI) DOCUMENTED: ICD-10-PCS | Mod: ,,, | Performed by: FAMILY MEDICINE

## 2023-11-17 PROCEDURE — 80053 COMPREHEN METABOLIC PANEL: CPT | Mod: ,,, | Performed by: CLINICAL MEDICAL LABORATORY

## 2023-11-17 PROCEDURE — 3078F DIAST BP <80 MM HG: CPT | Mod: ,,, | Performed by: FAMILY MEDICINE

## 2023-11-17 PROCEDURE — 1159F MED LIST DOCD IN RCRD: CPT | Mod: ,,, | Performed by: FAMILY MEDICINE

## 2023-11-17 PROCEDURE — 3044F PR MOST RECENT HEMOGLOBIN A1C LEVEL <7.0%: ICD-10-PCS | Mod: ,,, | Performed by: FAMILY MEDICINE

## 2023-11-17 PROCEDURE — 3044F HG A1C LEVEL LT 7.0%: CPT | Mod: ,,, | Performed by: FAMILY MEDICINE

## 2023-11-17 PROCEDURE — 3074F PR MOST RECENT SYSTOLIC BLOOD PRESSURE < 130 MM HG: ICD-10-PCS | Mod: ,,, | Performed by: FAMILY MEDICINE

## 2023-11-17 PROCEDURE — 82043 MICROALBUMIN / CREATININE RATIO URINE: ICD-10-PCS | Mod: ,,, | Performed by: CLINICAL MEDICAL LABORATORY

## 2023-11-17 PROCEDURE — 3008F BODY MASS INDEX DOCD: CPT | Mod: ,,, | Performed by: FAMILY MEDICINE

## 2023-11-17 PROCEDURE — 3078F PR MOST RECENT DIASTOLIC BLOOD PRESSURE < 80 MM HG: ICD-10-PCS | Mod: ,,, | Performed by: FAMILY MEDICINE

## 2023-11-17 PROCEDURE — 3074F SYST BP LT 130 MM HG: CPT | Mod: ,,, | Performed by: FAMILY MEDICINE

## 2023-11-17 PROCEDURE — 80053 COMPREHENSIVE METABOLIC PANEL: ICD-10-PCS | Mod: ,,, | Performed by: CLINICAL MEDICAL LABORATORY

## 2023-11-17 PROCEDURE — 1160F PR REVIEW ALL MEDS BY PRESCRIBER/CLIN PHARMACIST DOCUMENTED: ICD-10-PCS | Mod: ,,, | Performed by: FAMILY MEDICINE

## 2023-11-17 PROCEDURE — 1159F PR MEDICATION LIST DOCUMENTED IN MEDICAL RECORD: ICD-10-PCS | Mod: ,,, | Performed by: FAMILY MEDICINE

## 2023-11-17 PROCEDURE — 82043 UR ALBUMIN QUANTITATIVE: CPT | Mod: ,,, | Performed by: CLINICAL MEDICAL LABORATORY

## 2023-11-17 PROCEDURE — 99214 OFFICE O/P EST MOD 30 MIN: CPT | Mod: ,,, | Performed by: FAMILY MEDICINE

## 2023-11-17 PROCEDURE — 1160F RVW MEDS BY RX/DR IN RCRD: CPT | Mod: ,,, | Performed by: FAMILY MEDICINE

## 2023-11-17 PROCEDURE — 80061 LIPID PANEL: CPT | Mod: ,,, | Performed by: CLINICAL MEDICAL LABORATORY

## 2023-11-17 RX ORDER — ATORVASTATIN CALCIUM 10 MG/1
10 TABLET, FILM COATED ORAL DAILY
Qty: 90 TABLET | Refills: 1 | Status: SHIPPED | OUTPATIENT
Start: 2023-11-17 | End: 2024-11-16

## 2023-11-17 RX ORDER — HYDROGEN PEROXIDE 3 %
20 SOLUTION, NON-ORAL MISCELLANEOUS
Qty: 90 CAPSULE | Refills: 1 | Status: SHIPPED | OUTPATIENT
Start: 2023-11-17

## 2023-11-17 NOTE — PROGRESS NOTES
Subjective     Patient ID: Neville Polanco is a 62 y.o. male.    Chief Complaint: Hyperlipidemia and Color Me Healthy (Patient stated he is here for a Color Me Healthy Visit 1 of 2 for hyperlipidemia. )    Patient is here for a color me healthy for hyperlipidemia. He needs refills.       Review of Systems   Constitutional:  Negative for fatigue and fever.   HENT:  Negative for ear pain, postnasal drip, rhinorrhea and sinus pressure/congestion.    Respiratory:  Negative for cough and shortness of breath.    Cardiovascular:  Negative for chest pain.   Gastrointestinal:  Negative for abdominal pain, diarrhea, nausea and vomiting.   Genitourinary:  Negative for dysuria.   Neurological:  Negative for headaches.       Tobacco Use: Medium Risk (11/17/2023)    Patient History     Smoking Tobacco Use: Former     Smokeless Tobacco Use: Never     Passive Exposure: Past     Review of patient's allergies indicates:  No Known Allergies  Current Outpatient Medications   Medication Instructions    acetaminophen-codeine 300-30mg (TYLENOL #3) 300-30 mg Tab 1 tablet, Oral, Every 4 hours PRN    atorvastatin (LIPITOR) 10 mg, Oral, Daily    chlorhexidine (PERIDEX) 0.12 % solution SWISH 1/2 OZ FOR 30 SECONDS TWICE DAILY    esomeprazole (NEXIUM) 20 mg, Oral, Before breakfast    naproxen sodium (ANAPROX) 220 mg, Oral, Every 12 hours PRN    sildenafil (REVATIO) 20 mg Tab TAKE 2-5 TABLETS BY MOUTH 1 HOUR PRIOR TO INTERCOURSE (DO NOT TAKE WITH NITROGLYCERIN, NITRATES)    sildenafiL (VIAGRA) 100 mg, Oral, As needed (PRN), 1 hour before S.I.     Medications Discontinued During This Encounter   Medication Reason    esomeprazole (NEXIUM) 20 MG capsule Reorder    atorvastatin (LIPITOR) 10 MG tablet Reorder       Past Medical History:   Diagnosis Date    Benign prostatic hyperplasia with lower urinary tract symptoms     Chronic prostatitis     Elevated PSA     Male erectile dysfunction, unspecified      Health Maintenance Topics with due status: Not  Due       Topic Last Completion Date    Colorectal Cancer Screening 10/07/2019    Hemoglobin A1c (Prediabetes) 02/03/2023    Lipid Panel 11/17/2023       There is no immunization history on file for this patient.    Objective     Body mass index is 26.04 kg/m².  Wt Readings from Last 3 Encounters:   11/17/23 87.1 kg (192 lb)   08/02/23 90.4 kg (199 lb 6.4 oz)   05/25/23 89.4 kg (197 lb)     Ht Readings from Last 3 Encounters:   11/17/23 6' (1.829 m)   08/02/23 6' (1.829 m)   05/25/23 6' (1.829 m)     BP Readings from Last 3 Encounters:   11/17/23 125/75   08/02/23 137/81   05/25/23 130/71     Temp Readings from Last 3 Encounters:   11/17/23 98 °F (36.7 °C) (Oral)   05/25/23 97.1 °F (36.2 °C) (Oral)   02/03/23 97.7 °F (36.5 °C) (Oral)     Pulse Readings from Last 3 Encounters:   11/17/23 72   08/02/23 63   05/25/23 (!) 59     Resp Readings from Last 3 Encounters:   11/17/23 19   05/25/23 18   02/03/23 17     PF Readings from Last 3 Encounters:   No data found for PF       Physical Exam  HENT:      Head: Normocephalic and atraumatic.      Mouth/Throat:      Pharynx: Oropharynx is clear.   Cardiovascular:      Rate and Rhythm: Normal rate and regular rhythm.   Pulmonary:      Effort: Pulmonary effort is normal.      Breath sounds: Normal breath sounds.   Neurological:      Mental Status: He is alert and oriented to person, place, and time.   Psychiatric:         Mood and Affect: Mood normal.         Behavior: Behavior normal.         Assessment and Plan     Problem List Items Addressed This Visit          Cardiac/Vascular    Mixed hyperlipidemia - Primary    Relevant Medications    atorvastatin (LIPITOR) 10 MG tablet    Other Relevant Orders    Lipid Panel (Completed)    Microalbumin/Creatinine Ratio, Urine (Completed)    Comprehensive Metabolic Panel (Completed)     Other Visit Diagnoses       Gastroesophageal reflux disease, unspecified whether esophagitis present        Relevant Medications    esomeprazole (NEXIUM)  20 MG capsule            Plan: Refill medications. Labs done. Educational material given.      I have reviewed the medications, allergies, and problem list.     Goal Actions:    What type of visit is the patient here for today?: Color Me Healthy  Which Color Me Healthy program is the patient enrolling in?: Cholesterol  Is this a Wellness Follow Up?: No  What is your overall wellness goal? (select at least one): Lifestyle modifications, Lose weight, Understand disease process, Improve overall health  Choose 3: Biometric, Lifestyle, Nutrition  Biometric Actions: Attend regularly scheduled office visits  Lifestyle Actions : Take medications as prescribed  Nurtrition Actions: Eat a well-balanced diet

## 2023-11-17 NOTE — PATIENT INSTRUCTIONS
"Patient Education       High Cholesterol   The Basics   Written by the doctors and editors at [facility]   What is cholesterol? -- Cholesterol is a substance that is found in the blood. Everyone has some. It is needed for good health. The problem is, people sometimes have too much cholesterol. Compared with people with normal cholesterol, people with high cholesterol have a higher risk of heart attacks, strokes, and other health problems. The higher your cholesterol, the higher your risk of these problems.  Are there different types of cholesterol? -- Yes, there are a few different types. If you get a cholesterol test, you might hear your doctor or nurse talk about:  Total cholesterol  LDL cholesterol - Some people call this the "bad" cholesterol. That's because having high LDL levels raises your risk of heart attacks, strokes, and other health problems.  HDL cholesterol - Some people call this the "good" cholesterol. That's because people with high HDL levels tend to have a lower risk of heart attacks, strokes, and other health problems.   Non-HDL cholesterol - Non-HDL cholesterol is your total cholesterol minus your HDL cholesterol.  Triglycerides - Triglycerides are not cholesterol. They are another type of fat. But they often get measured when cholesterol is measured. (Having high triglycerides also seems to increase the risk of heart attacks and strokes.)  What should my numbers be? -- Ask your doctor or nurse what your numbers should be. Different people need different goals. (If you live outside the [place], see the table (table 1)). In general, people who do not already have heart disease should aim for:  Total cholesterol below 200  LDL cholesterol below 130 - or much lower, if they are at risk of heart attacks or strokes  HDL cholesterol above 60  Non-HDL cholesterol below 160 - or lower, if they are at risk of heart attacks or strokes  Triglycerides below 150  Keep in mind, though, that many people " "who cannot meet these goals still have a low risk of heart attacks and strokes.  What should I do if my doctor tells me I have high cholesterol? -- Ask your doctor what your overall risk of heart attacks and strokes is. High cholesterol, by itself, is not always a reason to worry. Having high cholesterol is just one of many things that can increase your risk of heart attacks and strokes. Other factors that increase your risk include:  Cigarette smoking  High blood pressure  Having a parent, sister, or brother who got heart disease at a young age - Young, in this case, means younger than 55 for men and younger than 65 for women.  A diet that is not heart healthy - A "heart-healthy" diet includes lots of fruits and vegetables, fiber, and healthy fats (like those found in fish and certain oils). It also means limiting sugar and unhealthy fats.  Older age  If you are at high risk of heart attacks and strokes, having high cholesterol is a problem. On the other hand, if you are at low risk, having high cholesterol might not lead to treatment.  Should I take medicine to lower cholesterol? -- Not everyone who has high cholesterol needs medicines. Your doctor or nurse will decide if you need them based on your age, family history, and other health concerns.  You should probably take a cholesterol-lowering medicine called a statin if you:  Already had a heart attack or stroke  Have known heart disease  Have diabetes  Have a condition called peripheral artery disease, which makes it painful to walk, and happens when the arteries in your legs get clogged with fatty deposits  Have an abdominal aortic aneurysm, which is a widening of the main artery in the belly  Most people with any of the conditions listed above should take a statin no matter what their cholesterol level is. If your doctor or nurse puts you on a statin, stay on it. The medicine might not make you feel any different. But it can help prevent heart attacks, " strokes, and death.  Can I lower my cholesterol without medicines? -- Yes, you can lower your cholesterol some by:  Avoiding red meat, butter, fried foods, cheese, and other foods that have a lot of saturated fat  Losing weight (if you are overweight)  Being more active  Even if these steps do little to change your cholesterol, they can improve your health in many ways.  All topics are updated as new evidence becomes available and our peer review process is complete.  This topic retrieved from [employer] on: Sep 21, 2021.  Topic 18323 Version 19.0  Release: 29.4.2 - C29.263  © 2021 UpToDate, Inc. and/or its affiliates. All rights reserved.  table 1: Cholesterol and triglyceride measurements in the [place] and elsewhere     Measurement used within the [place] Milligrams/deciliter (mg/dL)  Measurement used most places outside the [place] Millimoles/liter (mmol/Liter)     Level to aim for  Level to aim for    Total cholesterol  Below 200 Below 5.17   LDL cholesterol  Below 130 - or much lower if at risk of heart attack and stroke Below 3.36 - or much lower if at risk of heart attack and stroke   HDL cholesterol  Above 60 Above 1.55   Triglycerides  Below 150 Below 1.7   Cholesterol is measured differently in the [place] than it is in most other countries. This table shows values used within and outside the [place]. It includes the cholesterol and triglyceride levels that most people who do not have heart disease should aim for.  Graphic 60341 Version 3.0  Consumer Information Use and Disclaimer   This information is not specific medical advice and does not replace information you receive from your health care provider. This is only a brief summary of general information. It does NOT include all information about conditions, illnesses, injuries, tests, procedures, treatments, therapies, discharge instructions or life-style choices that may apply to you. You must talk with your health care  provider for complete information about your health and treatment options. This information should not be used to decide whether or not to accept your health care provider's advice, instructions or recommendations. Only your health care provider has the knowledge and training to provide advice that is right for you. The use of this information is governed by the [employer] End User License Agreement, available at https://www.[employer]/en/solutions/Wordinaire/about/fredi.The use of [employer] content is governed by the [employer] Terms of Use. ©2021 UpToDate, Inc. All rights reserved.  Copyright   © 2021 UpToDate, Inc. and/or its affiliates. All rights reserved.    Patient Education       Diet and Health   The Basics   [facility] and editors [facility]   Why is it important to eat a healthy diet? -- It's important to eat a healthy diet because eating the right foods can keep you healthy now and later on in life.  Which foods are especially healthy? -- Foods that are especially healthy include:  Fruits and vegetables - Eating a diet with lots of fruits and vegetables can help prevent heart disease and strokes. It might also help prevent certain types of cancers. Try to eat fruits and vegetables at each meal and also for snacks. If you don't have fresh fruits and vegetables available, you can eat frozen or canned ones instead. Doctors recommend eating at least 2 1/2 servings of vegetables and 2 servings of fruits each day.  Foods with fiber - Eating foods with a lot of fiber can help prevent heart disease and strokes. If you have type 2 diabetes, it can also help control your blood sugar. Foods that have a lot of fiber include vegetables, fruits, beans, nuts, oatmeal, and whole grain breads and cereals. You can tell how much fiber is in a food by reading the nutrition label (figure 1). Doctors recommend eating 25 to 36 grams of fiber each day.  Foods with folate (also called folic acid) - Folate is a  "vitamin that is important for pregnant people, since it helps prevent certain birth defects. Anyone who could get pregnant should get at least 400 micrograms of folic acid daily, whether or not they are actively trying to get pregnant. Folate is found in many breakfast cereals, oranges, orange juice, and green leafy vegetables.  Foods with calcium and vitamin D - Babies, children, and adults need calcium and vitamin D to help keep their bones strong. Adults also need calcium and vitamin D to help prevent osteoporosis. Osteoporosis is a condition that causes bones to get "thin" and break more easily than usual. Different foods and drinks have calcium and vitamin D in them (figure 2). People who don't get enough calcium and vitamin D in their diet might need to take a supplement.  Foods with protein - Protein helps your muscles stay strong. Healthy foods with a lot of protein include chicken, fish, eggs, beans, nuts, and soy products. Red meat also has a lot of protein, but it also contains fats, which can be unhealthy.  Some experts recommend a "Mediterranean diet." This involves eating a lot of fruits, vegetables, nuts, whole grains, and olive oil. It also includes some fish, poultry, and dairy products, but not a lot of red meat. Eating this way can help your overall health, and might even lower your risk of having a stroke.  What foods should I avoid or limit? -- To eat a healthy diet, there are some things you should avoid or limit. They include:   Fats - There are different types of fats. Some types of fats are better for your body than others.  Trans fats are especially unhealthy. They are found in margarines, many fast foods, and some store-bought baked goods. Trans fats can raise your cholesterol level and your increase your chance of getting heart disease. You should avoid eating foods with these types of fats.  The type of "polyunsaturated" fats found in fish seems to be healthy and can reduce your chance " "of getting heart disease. Other polyunsaturated fats might also be good for your health. When you cook, it's best to use oils with healthier fats, such as olive oil and canola oil.  Sugar - To have a healthy diet, it's important to limit or avoid sugar, sweets, and refined grains. Refined grains are found in white bread, white rice, most forms of pasta, and most packaged "snack" foods. Whole grains, such as whole-wheat bread and brown rice, have more fiber and are better for your health.  Avoiding sugar-sweetened beverages, like soda and sports drinks, can also help improve your health.  Red meat - Studies have shown that eating a lot of red meat can increase your risk of certain health problems, including heart disease and cancer. You should limit the amount of red meat that you eat.  Can I drink alcohol as part of a healthy diet? -- People who drink a small amount of alcohol each day might have a lower chance of getting heart disease. But drinking alcohol can lead to problems. For example, it can raise a person's chances of getting liver disease and certain types of cancers. In women, even 1 drink a day can increase the risk of getting breast cancer.  Most doctors recommend that adult women not have more than 1 drink a day and that adult men not have more than 2 drinks a day. The limits are different because most women's bodies take longer to break down alcohol.  How many calories do I need each day? -- The number of calories you need each day depends on your weight, height, age, sex, and how active you are.  Your doctor or nurse can tell you how many calories you should eat each day. If you are trying to lose weight, you should eat fewer calories each day.  What if I have questions? -- If you have questions about which foods you should or should not eat, ask your doctor or nurse. The right diet for you will depend, in part, on your health and any medical conditions you have.  All topics are updated as new " "evidence becomes available and our peer review process is complete.  This topic retrieved from [employer] on: Sep 21, 2021.  Topic 38493 Version 20.0  Release: 29.4.2 - C29.263  © 2021 UpToDate, Inc. and/or its affiliates. All rights reserved.  figure 1: Nutrition label - fiber     This is an example of a nutrition label. To figure out how much fiber is in a food, look for the line that says "Dietary Fiber." It's also important to look at the serving size. This food has 7 grams of fiber in each serving, and each serving is 1 cup.  Graphic 92503 Version 7.0    figure 2: Foods and drinks with calcium and vitamin D     Foods rich in calcium include ice cream, soy milk, breads, kale, broccoli, milk, cheese, cottage cheese, almonds, yogurt, ready-to-eat cereals, beans, and tofu. Foods rich in vitamin D include milk, fortified plant-based "milks" (soy, almond), canned tuna fish, cod liver oil, yogurt, ready-to-eat-cereals, cooked salmon, canned sardines, mackerel, and eggs. Some of these foods are rich in both.  Graphic 43016 Version 4.0    Consumer Information Use and Disclaimer   This information is not specific medical advice and does not replace information you receive from your health care provider. This is only a brief summary of general information. It does NOT include all information about conditions, illnesses, injuries, tests, procedures, treatments, therapies, discharge instructions or life-style choices that may apply to you. You must talk with your health care provider for complete information about your health and treatment options. This information should not be used to decide whether or not to accept your health care provider's advice, instructions or recommendations. Only your health care provider has the knowledge and training to provide advice that is right for you. The use of this information is governed by the [employer] End User License Agreement, available at " https://www.[employer].com/en/solutions/lexicomp/about/fredi.The use of [employer] content is governed by the [employer] Terms of Use. ©2021 [employer]. All rights reserved.  Copyright   © 2021 UpToDate, Inc. and/or its affiliates. All rights reserved.

## 2023-11-20 ENCOUNTER — PATIENT OUTREACH (OUTPATIENT)
Dept: ADMINISTRATIVE | Facility: HOSPITAL | Age: 63
End: 2023-11-20

## 2023-11-20 NOTE — PROGRESS NOTES
Population Health Chart Review & Patient Outreach Details      Further Action Needed If Patient Returns Outreach:            Updates Requested / Reviewed:     []  Care Everywhere    []     []  External Sources ([facility], [facility], [facility], etc.)    [] [facility]   [] [facility]   [] Other:    []  Care Team Updated   []  Removed  or Duplicate Orders   []  Immunization Reconciliation Completed / Queried    [] [place]   [] [place]   [] [place]   [] [place]      Health Maintenance Topics Addressed and Outreach Outcomes / Actions Taken:             Breast Cancer Screening []  Mammogram Order Placed    []  Mammogram Screening Scheduled    []  External Records Requested & Care Team Updated if Applicable    []  External Records Uploaded & Care Team Updated if Applicable    []  Pt Declined Scheduling Mammogram    []  Pt Will Schedule with External Provider / Order Routed & Care Team Updated if Applicable              Cervical Cancer Screening []  Pap Smear Scheduled in Primary Care or OBGYN    []  External Records Requested & Care Team Updated if Applicable       []  External Records Uploaded, Care Team Updated, & History Updated if Applicable    []  Patient Declined Scheduling Pap Smear    []  Patient Will Schedule with External Provider & Care Team Updated if Applicable                  Colorectal Cancer Screening []  Colonoscopy Case Request / Referral / Home Test Order Placed    []  External Records Requested & Care Team Updated if Applicable    []  External Records Uploaded, Care Team Updated, & History Updated if Applicable    []  Patient Declined Completing Colon Cancer Screening    []  Patient Will Schedule with External Provider & Care Team Updated if Applicable    []  Fit Kit Mailed (add the SmartPhrase under additional notes)    []  Reminded Patient to Complete Home Test                Diabetic Eye Exam []  Eye Exam Screening Order Placed    []  Eye Camera Scheduled or Optometry/Ophthalmology Referral  Placed    []  External Records Requested & Care Team Updated if Applicable    []  External Records Uploaded, Care Team Updated, & History Updated if Applicable    []  Patient Declined Scheduling Eye Exam    []  Patient Will Schedule with External Provider & Care Team Updated if Applicable             Blood Pressure Control []  Primary Care Follow Up Visit Scheduled     []  Remote Blood Pressure Reading Captured    []  Patient Declined Remote Reading or Scheduling Appt - Escalated to PCP    []  Patient Will Call Back or Send Portal Message with Reading                 HbA1c & Other Labs []  Overdue Lab(s) Ordered    []  Overdue Lab(s) Scheduled    []  External Records Uploaded & Care Team Updated if Applicable    []  Primary Care Follow Up Visit Scheduled     []  Reminded Patient to Complete A1c Home Test    []  Patient Declined Scheduling Labs or Will Call Back to Schedule    []  Patient Will Schedule with External Provider / Order Routed, & Care Team Updated if Applicable           Primary Care Appointment []  Primary Care Appt Scheduled    []  Patient Declined Scheduling or Will Call Back to Schedule    []  Pt Established with External Provider, Updated Care Team, & Informed Pt to Notify Payor if Applicable           Medication Adherence /    Statin Use []  Primary Care Appointment Scheduled    []  Patient Reminded to  Prescription    []  Patient Declined, Provider Notified if Needed    []  Sent Provider Message to Review to Evaluate Pt for Statin, Add Exclusion Dx Codes, Document   Exclusion in Problem List, Change Statin Intensity Level to Moderate or High Intensity if Applicable                Osteoporosis Screening []  Dexa Order Placed    []  Dexa Appointment Scheduled    []  External Records Requested & Care Team Updated    []  External Records Uploaded, Care Team Updated, & History Updated if Applicable    []  Patient Declined Scheduling Dexa or Will Call Back to Schedule    []  Patient Will Schedule  with External Provider / Order Routed & Care Team Updated if Applicable       Additional Notes:.  Post visit Population Health review of encounter with date of service  11/17/23 with Destiny.  All required CMH components in encounter.    Followup appt for: 5/29/24 HY

## 2023-12-04 ENCOUNTER — OFFICE VISIT (OUTPATIENT)
Dept: UROLOGY | Facility: CLINIC | Age: 63
End: 2023-12-04
Payer: COMMERCIAL

## 2023-12-04 VITALS
HEART RATE: 68 BPM | BODY MASS INDEX: 26.55 KG/M2 | WEIGHT: 196 LBS | SYSTOLIC BLOOD PRESSURE: 128 MMHG | DIASTOLIC BLOOD PRESSURE: 68 MMHG | HEIGHT: 72 IN

## 2023-12-04 DIAGNOSIS — N40.1 BENIGN PROSTATIC HYPERPLASIA WITH URINARY OBSTRUCTION: ICD-10-CM

## 2023-12-04 DIAGNOSIS — N41.1 CHRONIC PROSTATITIS: ICD-10-CM

## 2023-12-04 DIAGNOSIS — R97.20 ELEVATED PSA: Primary | ICD-10-CM

## 2023-12-04 DIAGNOSIS — N13.8 BENIGN PROSTATIC HYPERPLASIA WITH URINARY OBSTRUCTION: ICD-10-CM

## 2023-12-04 DIAGNOSIS — N52.9 ERECTILE DYSFUNCTION, UNSPECIFIED ERECTILE DYSFUNCTION TYPE: ICD-10-CM

## 2023-12-04 PROCEDURE — 1160F RVW MEDS BY RX/DR IN RCRD: CPT | Mod: S$GLB,,, | Performed by: UROLOGY

## 2023-12-04 PROCEDURE — 99213 OFFICE O/P EST LOW 20 MIN: CPT | Mod: S$GLB,,, | Performed by: UROLOGY

## 2023-12-04 PROCEDURE — 3044F PR MOST RECENT HEMOGLOBIN A1C LEVEL <7.0%: ICD-10-PCS | Mod: S$GLB,,, | Performed by: UROLOGY

## 2023-12-04 PROCEDURE — 99214 OFFICE O/P EST MOD 30 MIN: CPT | Mod: PBBFAC | Performed by: UROLOGY

## 2023-12-04 PROCEDURE — 3078F PR MOST RECENT DIASTOLIC BLOOD PRESSURE < 80 MM HG: ICD-10-PCS | Mod: S$GLB,,, | Performed by: UROLOGY

## 2023-12-04 PROCEDURE — 3078F DIAST BP <80 MM HG: CPT | Mod: S$GLB,,, | Performed by: UROLOGY

## 2023-12-04 PROCEDURE — 3074F PR MOST RECENT SYSTOLIC BLOOD PRESSURE < 130 MM HG: ICD-10-PCS | Mod: S$GLB,,, | Performed by: UROLOGY

## 2023-12-04 PROCEDURE — 3008F PR BODY MASS INDEX (BMI) DOCUMENTED: ICD-10-PCS | Mod: S$GLB,,, | Performed by: UROLOGY

## 2023-12-04 PROCEDURE — 3008F BODY MASS INDEX DOCD: CPT | Mod: S$GLB,,, | Performed by: UROLOGY

## 2023-12-04 PROCEDURE — 1160F PR REVIEW ALL MEDS BY PRESCRIBER/CLIN PHARMACIST DOCUMENTED: ICD-10-PCS | Mod: S$GLB,,, | Performed by: UROLOGY

## 2023-12-04 PROCEDURE — 3074F SYST BP LT 130 MM HG: CPT | Mod: S$GLB,,, | Performed by: UROLOGY

## 2023-12-04 PROCEDURE — 3044F HG A1C LEVEL LT 7.0%: CPT | Mod: S$GLB,,, | Performed by: UROLOGY

## 2023-12-04 PROCEDURE — 99213 PR OFFICE/OUTPT VISIT, EST, LEVL III, 20-29 MIN: ICD-10-PCS | Mod: S$GLB,,, | Performed by: UROLOGY

## 2023-12-04 PROCEDURE — 1159F MED LIST DOCD IN RCRD: CPT | Mod: S$GLB,,, | Performed by: UROLOGY

## 2023-12-04 PROCEDURE — 1159F PR MEDICATION LIST DOCUMENTED IN MEDICAL RECORD: ICD-10-PCS | Mod: S$GLB,,, | Performed by: UROLOGY

## 2023-12-04 RX ORDER — SILDENAFIL 100 MG/1
100 TABLET, FILM COATED ORAL DAILY PRN
Qty: 30 TABLET | Refills: 11 | Status: SHIPPED | OUTPATIENT
Start: 2023-12-04 | End: 2024-12-03

## 2023-12-04 RX ORDER — TAMSULOSIN HYDROCHLORIDE 0.4 MG/1
0.4 CAPSULE ORAL NIGHTLY
Qty: 30 CAPSULE | Refills: 11 | Status: SHIPPED | OUTPATIENT
Start: 2023-12-04 | End: 2024-12-03

## 2023-12-04 NOTE — PATIENT INSTRUCTIONS
Prescription given for tamsulosin to see if it will help with voiding.  Continue sildenafil p.r.n.  PSA returned just as patient was leaving the office.  It was 3.820 and patient notified.  Six-month appointment with PSA.

## 2023-12-04 NOTE — PROGRESS NOTES
Subjective     Patient ID: Neville Polanco is a 63 y.o. male.    Chief Complaint: Follow-up (6 month visit, PSA elevation)    History of Present Illness  Mr. Polanco is 53 years old. He is a  with silicosis of the right lung. Sent to me by Dr. Sanjana Mclean for high normal PSA and low normal testosterone values. Main complaint is lack of energy. This is something that seems to have started only in the relatively recent past. PSA was the first one he ever had done and was on June 8; it was right at the upper end of normal at 3.110, which is right at the upper end of normal. His testosterone was 335 which is lower end of normal. Neither study has been repeated for documentation. There is no history of urinary tract infections, stones, hematuria, or any other urinary tract problems. To his knowledge there  is no family history of prostate cancer.  (August 6, 2014)     Mr. Polanco is brought back today for 6 month PSA since the PSA he had in June was in the very high normal range. We did repeat his testosterone on the above visit and it is normal at 304. The previous one had been 335. These are in the low normal range but are normal. No improvement in energy level. No new medical problems.  (December 15, 2014)     Mr. Polanco is basically the same as he was one year ago. In for his yearly checkup and his PSA is stable at 2.54. Testosterone is still technically normal at 278.1. He  has no significant energy level and loss of libido. He has very minimal sexual function. He would like to try PDE 5 inhibitors to help.  (December 15, 2015)     Mr. Polanco has had Good year in general and is voiding as well as a year ago. PSA has jumped up to 4.770 which is surprising because he is having no symptoms of prostate infection, UTIs, or any other increased problems.  (February 7, 2017)     Mr. Polanco returned for follow-up today with PSA. Was treated with Cipro on last visit but was only able to take about half of  that. He developed some rash on the parts of his body exposed to sunlight and may have had a photosensitivity related to taking medication. I don't think he had a true reaction to the drug. He only took about half. Doing okay now and PSA down slightly but still elevated for age. Voiding is stable. He did not get sildenafil filled because his drugstore apparently did not have it. Needs new prescription he can take around and get the best price.  (May 9, 2017)     Mr. Polanco was brought back because of PSA elevation. PSA today back up to 4.570. Clinically he is doing okay without any increase in voiding difficulty but definitely has elevated PSA. Probably needs to consider biopsies. (August 14 2017)     Mr. Polanco  returned today for follow-up of his chronically elevated PSA. He had biopsies on last visit and it showed acute and chronic prostatitis without any significant dysplasia, PIN, other premalignant changes. He did try the generic sildenafi and it did work satisfactorily for him. Doing okay clinically without any worsening bladder outlet obstructive symptoms or other difficulty. Patient waited to L5 hours for PSA to be returned and it never returned while he was here. Subsequently returned after he left as 3.520. Patient notified by phone.  (February 14, 2018)     Mr. Polanco is in for six-month follow-up of chronic PSA elevation. His PSA was actually down slightly last time. He's had a good 6 months with no increasing problems with voiding or other difficulties. PSA is pending.  (August 24, 2018)     Mr. Polanco has had a good 6 months overall as far as health problems are concerned. PSA back up slightly to 4.21 but has been riding a tractor for the last couple weeks working very hard. Certainly that could be a factor. No worsening bladder outlet obstructive symptoms or other new problems.  (February 25, 2019)     Mr. Polanco is in for six-month follow-up of PSA elevation. He had biopsies of his prostate 2  "years ago. No increased trouble with voiding. Overall has had a good 6 months. A little bit of bleeding from his rectum recently and feels he might have a "polyp" but really has never had a colonoscopy and needs to have that checked because of the bleeding. PSA returned as 5.670. Has been 2 years since his biopsies. He probably needs to have additional biopsies or MRI of the prostate. He would prefer to have an MRI first before additional biopsies. (August 26, 2019)     Mr. Polanco is in for six-month follow-up of chronic PSA elevation. He had his MRI of the prostate done on October 1 at [facility]. This revealed only PI-rads 2 lesion and additional biopsies were not needed. He is stable with voiding. Had a PSA done today that is pending. He's had no further rectal bleeding. Weight is exactly the same as was 6 months ago. No new health issues. (April 20, 2020)     Mr. Polanco is in for six-month follow-up of chronic PSA elevation. Had MRI of the prostate last year and did not reveal suspicious lesions. He is stable with voiding in general and has no worsening bladder outlet obstructive symptoms. He feels he does not need anything to help with voiding. Stable overall and has had a good 6 months. (October 27, 2020)  ------------------------------------------------------------------------------------------------------------------------------------------------------------     PSA Results:  Past PSA Results   PSA was 4.870 on October 27, 2020, Past PSA Results   PSA was 3.750 on August 24, 2018, Past PSA Results   PSA was 3.520 on February 14, 2018  PSA was 4.570 on 8/14/2017  4.090 on May 9, 2017  4.770 on February 7, 2017  2.540 on December 15, 2015  2.490 on December 15, 2014  3.110 on June 8, 2014  -------------------------------------------------------------------------------------------------------------------------------------------------------------------------------------------     Mr. Polanco is in for " six-month follow-up of chronic PSA elevation.  He is doing fine clinically with no worsening bladder outlet obstructive symptoms.  PSA pending when he was here.  One previous negative biopsy in 1 MRI that did not reveal suspicious lesions.  PSA returned after he left and is up slightly to 5.490. (April 27, 2021)     PSA was 5.490 on April 27, 2021  PSA was 5.010 on November 1, 2021   PSA was 6.120 on May 2, 2022  PSA was 4.870 on November PSA was 3.820 8, 2022  PSA was 6.130 on May 8, 2023  PSA was 3.820 on December 4, 2023    Mr. Polanco is in for 6 month follow-up of PSA elevation.  He has had a good 6 months with no worsening bladder outlet obstructive symptoms.  He has had no new health issues that he is aware of.  He had his PSA drawn and results are pending.(November 1, 2021)     Mr. Polanco is in for his 6 monthFollow-up of PSA elevation.  PSA drawn earlier today and is pending.  He has had a reasonably good 6 months except when he aspirated some black mold causing acute pulmonary problems.  That problem is better now.  No new  issues.  Voiding as well as he did last time he was here. (May 2, 2022)     Mr. Polanco is in for 6 month follow-up of chronic PSA elevation.  Doing okay clinically with no worsening bladder outlet obstructive symptoms and no new health issues.  3 lb weight gain.  PSA drawn and pending. [November 8, 2022]      Mr. Polanco is in for follow-up of his chronic PSA elevation.  No worsening of bladder outlet obstructive symptoms.  He has regained weight he had lost last time.  He has had no new health issues.  PSA reported just as patient was leaving office and it was reported to him as 6.130.  That is essentially the same as it was 1 year ago.  [May 8, 2023]     Mr. Polanco is in for follow-up of chronic PSA elevation.  Clinically doing okay with gradual worsening bladder outlet obstruction.  He is agreeable to trying Medication to see if it will help with bladder emptying. PSA drawn and  pending. [December 4, 2023]    Follow-up      Review of Systems       Objective     Physical Exam  Constitutional:       Appearance: Normal appearance. He is normal weight.   Genitourinary:     Prostate: Normal.      Rectum: Normal.      Comments: Prostate 30 g symmetrical smooth and benign feeling  Neurological:      Mental Status: He is alert.   Psychiatric:         Mood and Affect: Mood normal.         Behavior: Behavior normal.     Urinalysis revealed only occasional pus cells.  The dipstick was negative with pH 7.0 and specific gravity 1.020       1. Elevated PSA  -     PSA, Total (Diagnostic); Future; Expected date: 06/04/2024    2. Chronic prostatitis    3. Benign prostatic hyperplasia with urinary obstruction    4. Erectile dysfunction, unspecified erectile dysfunction type    Other orders  -     sildenafiL (VIAGRA) 100 MG tablet; Take 1 tablet (100 mg total) by mouth daily as needed for Erectile Dysfunction.  Dispense: 30 tablet; Refill: 11  -     tamsulosin (FLOMAX) 0.4 mg Cap; Take 1 capsule (0.4 mg total) by mouth nightly.  Dispense: 30 capsule; Refill: 11      Prescription given for tamsulosin to see if it will help with voiding.  Continue sildenafil p.r.n.  PSA returned just as patient was leaving the office.  It was 3.820 and patient notified.  Six-month appointment with PSA.         No follow-ups on file.

## 2023-12-22 ENCOUNTER — OFFICE VISIT (OUTPATIENT)
Dept: FAMILY MEDICINE | Facility: CLINIC | Age: 63
End: 2023-12-22
Payer: COMMERCIAL

## 2023-12-22 VITALS
RESPIRATION RATE: 18 BRPM | HEIGHT: 72 IN | BODY MASS INDEX: 26.28 KG/M2 | HEART RATE: 63 BPM | DIASTOLIC BLOOD PRESSURE: 65 MMHG | OXYGEN SATURATION: 98 % | SYSTOLIC BLOOD PRESSURE: 134 MMHG | TEMPERATURE: 99 F | WEIGHT: 194 LBS

## 2023-12-22 DIAGNOSIS — R05.8 COUGH WITH EXPOSURE TO COVID-19 VIRUS: ICD-10-CM

## 2023-12-22 DIAGNOSIS — J06.9 UPPER RESPIRATORY TRACT INFECTION, UNSPECIFIED TYPE: Primary | ICD-10-CM

## 2023-12-22 DIAGNOSIS — J02.9 SORE THROAT: ICD-10-CM

## 2023-12-22 DIAGNOSIS — Z20.822 COUGH WITH EXPOSURE TO COVID-19 VIRUS: ICD-10-CM

## 2023-12-22 LAB
CTP QC/QA: YES
FLUAV AG NPH QL: NEGATIVE
FLUBV AG NPH QL: NEGATIVE
S PYO RRNA THROAT QL PROBE: NEGATIVE
SARS-COV-2 RDRP RESP QL NAA+PROBE: NEGATIVE

## 2023-12-22 PROCEDURE — 87880 POCT RAPID STREP A: ICD-10-PCS | Mod: QW,,, | Performed by: FAMILY MEDICINE

## 2023-12-22 PROCEDURE — 3044F HG A1C LEVEL LT 7.0%: CPT | Mod: ,,, | Performed by: FAMILY MEDICINE

## 2023-12-22 PROCEDURE — 3075F PR MOST RECENT SYSTOLIC BLOOD PRESS GE 130-139MM HG: ICD-10-PCS | Mod: ,,, | Performed by: FAMILY MEDICINE

## 2023-12-22 PROCEDURE — 3044F PR MOST RECENT HEMOGLOBIN A1C LEVEL <7.0%: ICD-10-PCS | Mod: ,,, | Performed by: FAMILY MEDICINE

## 2023-12-22 PROCEDURE — 87804 POCT INFLUENZA A/B: ICD-10-PCS | Mod: QW,,, | Performed by: FAMILY MEDICINE

## 2023-12-22 PROCEDURE — 1160F PR REVIEW ALL MEDS BY PRESCRIBER/CLIN PHARMACIST DOCUMENTED: ICD-10-PCS | Mod: ,,, | Performed by: FAMILY MEDICINE

## 2023-12-22 PROCEDURE — 87880 STREP A ASSAY W/OPTIC: CPT | Mod: QW,,, | Performed by: FAMILY MEDICINE

## 2023-12-22 PROCEDURE — 99214 OFFICE O/P EST MOD 30 MIN: CPT | Mod: 25,,, | Performed by: FAMILY MEDICINE

## 2023-12-22 PROCEDURE — 87804 INFLUENZA ASSAY W/OPTIC: CPT | Mod: QW,,, | Performed by: FAMILY MEDICINE

## 2023-12-22 PROCEDURE — 3078F PR MOST RECENT DIASTOLIC BLOOD PRESSURE < 80 MM HG: ICD-10-PCS | Mod: ,,, | Performed by: FAMILY MEDICINE

## 2023-12-22 PROCEDURE — 3008F PR BODY MASS INDEX (BMI) DOCUMENTED: ICD-10-PCS | Mod: ,,, | Performed by: FAMILY MEDICINE

## 2023-12-22 PROCEDURE — 3078F DIAST BP <80 MM HG: CPT | Mod: ,,, | Performed by: FAMILY MEDICINE

## 2023-12-22 PROCEDURE — 87635 SARS-COV-2 COVID-19 AMP PRB: CPT | Mod: ,,, | Performed by: FAMILY MEDICINE

## 2023-12-22 PROCEDURE — 3075F SYST BP GE 130 - 139MM HG: CPT | Mod: ,,, | Performed by: FAMILY MEDICINE

## 2023-12-22 PROCEDURE — 99214 PR OFFICE/OUTPT VISIT, EST, LEVL IV, 30-39 MIN: ICD-10-PCS | Mod: 25,,, | Performed by: FAMILY MEDICINE

## 2023-12-22 PROCEDURE — 87635: ICD-10-PCS | Mod: ,,, | Performed by: FAMILY MEDICINE

## 2023-12-22 PROCEDURE — 1160F RVW MEDS BY RX/DR IN RCRD: CPT | Mod: ,,, | Performed by: FAMILY MEDICINE

## 2023-12-22 PROCEDURE — 1159F MED LIST DOCD IN RCRD: CPT | Mod: ,,, | Performed by: FAMILY MEDICINE

## 2023-12-22 PROCEDURE — 96372 PR INJECTION,THERAP/PROPH/DIAG2ST, IM OR SUBCUT: ICD-10-PCS | Mod: ,,, | Performed by: FAMILY MEDICINE

## 2023-12-22 PROCEDURE — 3008F BODY MASS INDEX DOCD: CPT | Mod: ,,, | Performed by: FAMILY MEDICINE

## 2023-12-22 PROCEDURE — 96372 THER/PROPH/DIAG INJ SC/IM: CPT | Mod: ,,, | Performed by: FAMILY MEDICINE

## 2023-12-22 PROCEDURE — 1159F PR MEDICATION LIST DOCUMENTED IN MEDICAL RECORD: ICD-10-PCS | Mod: ,,, | Performed by: FAMILY MEDICINE

## 2023-12-22 RX ORDER — METHYLPREDNISOLONE ACETATE 40 MG/ML
40 INJECTION, SUSPENSION INTRA-ARTICULAR; INTRALESIONAL; INTRAMUSCULAR; SOFT TISSUE
Status: COMPLETED | OUTPATIENT
Start: 2023-12-22 | End: 2023-12-22

## 2023-12-22 RX ORDER — DEXAMETHASONE SODIUM PHOSPHATE 4 MG/ML
4 INJECTION, SOLUTION INTRA-ARTICULAR; INTRALESIONAL; INTRAMUSCULAR; INTRAVENOUS; SOFT TISSUE
Status: COMPLETED | OUTPATIENT
Start: 2023-12-22 | End: 2023-12-22

## 2023-12-22 RX ORDER — AZITHROMYCIN 250 MG/1
TABLET, FILM COATED ORAL
Qty: 6 TABLET | Refills: 0 | Status: SHIPPED | OUTPATIENT
Start: 2023-12-22 | End: 2023-12-27

## 2023-12-22 RX ADMIN — METHYLPREDNISOLONE ACETATE 40 MG: 40 INJECTION, SUSPENSION INTRA-ARTICULAR; INTRALESIONAL; INTRAMUSCULAR; SOFT TISSUE at 02:12

## 2023-12-22 RX ADMIN — DEXAMETHASONE SODIUM PHOSPHATE 4 MG: 4 INJECTION, SOLUTION INTRA-ARTICULAR; INTRALESIONAL; INTRAMUSCULAR; INTRAVENOUS; SOFT TISSUE at 02:12

## 2023-12-22 NOTE — PROGRESS NOTES
New Clinic Note    Neville Polanco is a 63 y.o. male     CC:   Chief Complaint   Patient presents with    Sinusitis     Complains of 4 days of sinus drainage, sore throat.  and this am has a productive cough that is green in color. 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 Took OTC DayQuil and Psuedofed for sinus and stated he had no fever    Cough    Sore Throat        Subjective    History of Present Illness   Patient complains of cough and congestion for 4 days. He complains of sore throat. He denies fever or headache. He has taken Dayquil and sudafed without relief.     Current Outpatient Medications:     atorvastatin (LIPITOR) 10 MG tablet, Take 1 tablet (10 mg total) by mouth once daily., Disp: 90 tablet, Rfl: 1    esomeprazole (NEXIUM) 20 MG capsule, Take 1 capsule (20 mg total) by mouth before breakfast., Disp: 90 capsule, Rfl: 1    naproxen sodium (ANAPROX) 220 MG tablet, Take 1 tablet (220 mg total) by mouth every 12 (twelve) hours as needed (pain)., Disp: 180 tablet, Rfl: 1    sildenafiL (VIAGRA) 100 MG tablet, Take 1 tablet (100 mg total) by mouth as needed for Erectile Dysfunction. 1 hour before S.I., Disp: 20 tablet, Rfl: 11    tamsulosin (FLOMAX) 0.4 mg Cap, Take 1 capsule (0.4 mg total) by mouth nightly., Disp: 30 capsule, Rfl: 11    acetaminophen-codeine 300-30mg (TYLENOL #3) 300-30 mg Tab, Take 1 tablet by mouth every 4 (four) hours as needed., Disp: , Rfl:     azithromycin (Z-WILMER) 250 MG tablet, Take 2 tablets by mouth on day 1; Take 1 tablet by mouth on days 2-5, Disp: 6 tablet, Rfl: 0    brompheniramin-phenylephrin-DM (RYNEX DM) 1-2.5-5 mg/5 mL Soln, Take 10 mLs by mouth every 4 (four) hours as needed., Disp: 180 mL, Rfl: 1    chlorhexidine (PERIDEX) 0.12 % solution, SWISH 1/2 OZ FOR 30 SECONDS TWICE DAILY, Disp: , Rfl:     sildenafil (REVATIO) 20 mg Tab, TAKE 2-5 TABLETS BY MOUTH 1 HOUR PRIOR TO INTERCOURSE (DO NOT TAKE WITH NITROGLYCERIN, NITRATES),  Disp: , Rfl:     sildenafiL (VIAGRA) 100 MG tablet, Take 1 tablet (100 mg total) by mouth daily as needed for Erectile Dysfunction. (Patient not taking: Reported on 12/22/2023), Disp: 30 tablet, Rfl: 11  No current facility-administered medications for this visit.     Past Medical History:   Diagnosis Date    Benign prostatic hyperplasia with lower urinary tract symptoms     Chronic prostatitis     Elevated PSA     Male erectile dysfunction, unspecified         Family History   Problem Relation Age of Onset    Aneurysm Mother     COPD Father         History reviewed. No pertinent surgical history.     Review of Systems   Constitutional:  Negative for fatigue and fever.   HENT:  Positive for nasal congestion, postnasal drip, rhinorrhea, sinus pressure/congestion and sore throat. Negative for ear pain.    Respiratory:  Positive for cough. Negative for shortness of breath.    Cardiovascular:  Negative for chest pain.   Gastrointestinal:  Negative for abdominal pain, diarrhea, nausea and vomiting.   Genitourinary:  Negative for dysuria.   Neurological:  Negative for headaches.        /65 (BP Location: Left arm, Patient Position: Sitting, BP Method: Large (Automatic))   Pulse 63   Temp 98.8 °F (37.1 °C) (Oral)   Resp 18   Ht 6' (1.829 m)   Wt 88 kg (194 lb)   SpO2 98%   BMI 26.31 kg/m²      Physical Exam  HENT:      Head: Normocephalic and atraumatic.      Nose: Rhinorrhea present.   Cardiovascular:      Rate and Rhythm: Normal rate and regular rhythm.   Pulmonary:      Effort: Pulmonary effort is normal.      Breath sounds: Normal breath sounds.   Neurological:      Mental Status: He is alert and oriented to person, place, and time.   Psychiatric:         Mood and Affect: Mood normal.         Behavior: Behavior normal.          Assessment and Plan      ICD-10-CM ICD-9-CM   1. Upper respiratory tract infection, unspecified type  J06.9 465.9   2. Cough with exposure to COVID-19 virus  R05.8 786.2    Z20.822  V01.79   3. Sore throat  J02.9 462        1. Upper respiratory tract infection, unspecified type  -     methylPREDNISolone acetate injection 40 mg  -     dexAMETHasone injection 4 mg  -     brompheniramin-phenylephrin-DM (RYNEX DM) 1-2.5-5 mg/5 mL Soln; Take 10 mLs by mouth every 4 (four) hours as needed.  Dispense: 180 mL; Refill: 1  -     azithromycin (Z-WILMER) 250 MG tablet; Take 2 tablets by mouth on day 1; Take 1 tablet by mouth on days 2-5  Dispense: 6 tablet; Refill: 0    2. Cough with exposure to COVID-19 virus  Tests are negative  -     POCT Influenza A/B  -     POCT COVID-19 Rapid Screening    3. Sore throat  Tests are negative  -     POCT rapid strep A  -     POCT Influenza A/B         Patient Instructions   Take medications as directed  Monitor temperature, if fever begins, tylenol or ibuprofen can be used as long as you have no history of abnormal reactions to these medications. Follow the instructions on the bottle or contact clinic with questions.   Please contact clinic if symptoms begin to get worse.   Report to the ER if you feel your symptoms are severe and life threatening.       Follow up if symptoms worsen or fail to improve.

## 2023-12-26 ENCOUNTER — PATIENT OUTREACH (OUTPATIENT)
Dept: ADMINISTRATIVE | Facility: HOSPITAL | Age: 63
End: 2023-12-26

## 2023-12-26 NOTE — PROGRESS NOTES
Population Health Chart Review & Patient Outreach Details      Further Action Needed If Patient Returns Outreach:            Updates Requested / Reviewed:     []  Care Everywhere    []     []  External Sources ([facility], [facility], [facility], etc.)    [] [facility]   [] [facility]   [] Other:    []  Care Team Updated   []  Removed  or Duplicate Orders   []  Immunization Reconciliation Completed / Queried    [] [place]   [] [place]   [] [place]   [] [place]      Health Maintenance Topics Addressed and Outreach Outcomes / Actions Taken:             Breast Cancer Screening []  Mammogram Order Placed    []  Mammogram Screening Scheduled    []  External Records Requested & Care Team Updated if Applicable    []  External Records Uploaded & Care Team Updated if Applicable    []  Pt Declined Scheduling Mammogram    []  Pt Will Schedule with External Provider / Order Routed & Care Team Updated if Applicable              Cervical Cancer Screening []  Pap Smear Scheduled in Primary Care or OBGYN    []  External Records Requested & Care Team Updated if Applicable       []  External Records Uploaded, Care Team Updated, & History Updated if Applicable    []  Patient Declined Scheduling Pap Smear    []  Patient Will Schedule with External Provider & Care Team Updated if Applicable                  Colorectal Cancer Screening []  Colonoscopy Case Request / Referral / Home Test Order Placed    []  External Records Requested & Care Team Updated if Applicable    []  External Records Uploaded, Care Team Updated, & History Updated if Applicable    []  Patient Declined Completing Colon Cancer Screening    []  Patient Will Schedule with External Provider & Care Team Updated if Applicable    []  Fit Kit Mailed (add the SmartPhrase under additional notes)    []  Reminded Patient to Complete Home Test                Diabetic Eye Exam []  Eye Exam Screening Order Placed    []  Eye Camera Scheduled or Optometry/Ophthalmology Referral  Placed    []  External Records Requested & Care Team Updated if Applicable    []  External Records Uploaded, Care Team Updated, & History Updated if Applicable    []  Patient Declined Scheduling Eye Exam    []  Patient Will Schedule with External Provider & Care Team Updated if Applicable             Blood Pressure Control []  Primary Care Follow Up Visit Scheduled     []  Remote Blood Pressure Reading Captured    []  Patient Declined Remote Reading or Scheduling Appt - Escalated to PCP    []  Patient Will Call Back or Send Portal Message with Reading                 HbA1c & Other Labs []  Overdue Lab(s) Ordered    []  Overdue Lab(s) Scheduled    []  External Records Uploaded & Care Team Updated if Applicable    []  Primary Care Follow Up Visit Scheduled     []  Reminded Patient to Complete A1c Home Test    []  Patient Declined Scheduling Labs or Will Call Back to Schedule    []  Patient Will Schedule with External Provider / Order Routed, & Care Team Updated if Applicable           Primary Care Appointment []  Primary Care Appt Scheduled    []  Patient Declined Scheduling or Will Call Back to Schedule    []  Pt Established with External Provider, Updated Care Team, & Informed Pt to Notify Payor if Applicable           Medication Adherence /    Statin Use []  Primary Care Appointment Scheduled    []  Patient Reminded to  Prescription    []  Patient Declined, Provider Notified if Needed    []  Sent Provider Message to Review to Evaluate Pt for Statin, Add Exclusion Dx Codes, Document   Exclusion in Problem List, Change Statin Intensity Level to Moderate or High Intensity if Applicable                Osteoporosis Screening []  Dexa Order Placed    []  Dexa Appointment Scheduled    []  External Records Requested & Care Team Updated    []  External Records Uploaded, Care Team Updated, & History Updated if Applicable    []  Patient Declined Scheduling Dexa or Will Call Back to Schedule    []  Patient Will Schedule  with External Provider / Order Routed & Care Team Updated if Applicable       Additional Notes:.  Population Health review of encounter with date of service 12/22/23.  Does not qualify for HY or [facility].  Has HY scheduled for 5/29/24 HY

## 2024-01-05 ENCOUNTER — PATIENT OUTREACH (OUTPATIENT)
Dept: ADMINISTRATIVE | Facility: HOSPITAL | Age: 64
End: 2024-01-05

## 2024-01-05 NOTE — PROGRESS NOTES
Per [employer] website, insurance is active and patient is enrolled in [facility]:  HY  had 23  [facility] (2,moderate) HTN,,cholesterol -2024  [facility] 1/2 -  had #1 23            Population Health Chart Review & Patient Outreach Details      Further Action Needed If Patient Returns Outreach:            Updates Requested / Reviewed:     []  Care Everywhere    []     []  External Sources ([facility], [facility], [facility], etc.)    [] [facility]   [] [facility]   [] Other:    []  Care Team Updated   []  Removed  or Duplicate Orders   []  Immunization Reconciliation Completed / Queried    [] [place]   [] [place]   [] [place]   [] [place]      Health Maintenance Topics Addressed and Outreach Outcomes / Actions Taken:             Breast Cancer Screening []  Mammogram Order Placed    []  Mammogram Screening Scheduled    []  External Records Requested & Care Team Updated if Applicable    []  External Records Uploaded & Care Team Updated if Applicable    []  Pt Declined Scheduling Mammogram    []  Pt Will Schedule with External Provider / Order Routed & Care Team Updated if Applicable              Cervical Cancer Screening []  Pap Smear Scheduled in Primary Care or OBGYN    []  External Records Requested & Care Team Updated if Applicable       []  External Records Uploaded, Care Team Updated, & History Updated if Applicable    []  Patient Declined Scheduling Pap Smear    []  Patient Will Schedule with External Provider & Care Team Updated if Applicable                  Colorectal Cancer Screening []  Colonoscopy Case Request / Referral / Home Test Order Placed    []  External Records Requested & Care Team Updated if Applicable    []  External Records Uploaded, Care Team Updated, & History Updated if Applicable    []  Patient Declined Completing Colon Cancer Screening    []  Patient Will Schedule with External Provider & Care Team Updated if Applicable    []  Fit Kit Mailed (add the SmartPhrase under additional notes)     []  Reminded Patient to Complete Home Test                Diabetic Eye Exam []  Eye Exam Screening Order Placed    []  Eye Camera Scheduled or Optometry/Ophthalmology Referral Placed    []  External Records Requested & Care Team Updated if Applicable    []  External Records Uploaded, Care Team Updated, & History Updated if Applicable    []  Patient Declined Scheduling Eye Exam    []  Patient Will Schedule with External Provider & Care Team Updated if Applicable             Blood Pressure Control []  Primary Care Follow Up Visit Scheduled     []  Remote Blood Pressure Reading Captured    []  Patient Declined Remote Reading or Scheduling Appt - Escalated to PCP    []  Patient Will Call Back or Send Portal Message with Reading                 HbA1c & Other Labs []  Overdue Lab(s) Ordered    []  Overdue Lab(s) Scheduled    []  External Records Uploaded & Care Team Updated if Applicable    []  Primary Care Follow Up Visit Scheduled     []  Reminded Patient to Complete A1c Home Test    []  Patient Declined Scheduling Labs or Will Call Back to Schedule    []  Patient Will Schedule with External Provider / Order Routed, & Care Team Updated if Applicable           Primary Care Appointment []  Primary Care Appt Scheduled    []  Patient Declined Scheduling or Will Call Back to Schedule    []  Pt Established with External Provider, Updated Care Team, & Informed Pt to Notify Payor if Applicable           Medication Adherence /    Statin Use []  Primary Care Appointment Scheduled    []  Patient Reminded to  Prescription    []  Patient Declined, Provider Notified if Needed    []  Sent Provider Message to Review to Evaluate Pt for Statin, Add Exclusion Dx Codes, Document   Exclusion in Problem List, Change Statin Intensity Level to Moderate or High Intensity if Applicable                Osteoporosis Screening []  Dexa Order Placed    []  Dexa Appointment Scheduled    []  External Records Requested & Care Team Updated     []  External Records Uploaded, Care Team Updated, & History Updated if Applicable    []  Patient Declined Scheduling Dexa or Will Call Back to Schedule    []  Patient Will Schedule with External Provider / Order Routed & Care Team Updated if Applicable       Additional Notes:

## 2024-04-01 ENCOUNTER — PATIENT OUTREACH (OUTPATIENT)
Dept: ADMINISTRATIVE | Facility: HOSPITAL | Age: 64
End: 2024-04-01

## 2024-04-01 NOTE — PROGRESS NOTES
Population Health Chart Review & Patient Outreach Details    Per [facility] website, insurance is active and pt is listed on the attributed list needing a healthy you performed in   HY scheduled for 24  [facility] #1of2 completed  Further Action Needed If Patient Returns Outreach:            Updates Requested / Reviewed:     []  Care Everywhere    []     []  External Sources ([facility], [facility], [facility], etc.)    [] [facility]   [] [facility]   [] Other:    []  Care Team Updated   []  Removed  or Duplicate Orders   []  Immunization Reconciliation Completed / Queried    [] [place]   [] [place]   [] [place]   [] [place]      Health Maintenance Topics Addressed and Outreach Outcomes / Actions Taken:             Breast Cancer Screening []  Mammogram Order Placed    []  Mammogram Screening Scheduled    []  External Records Requested & Care Team Updated if Applicable    []  External Records Uploaded & Care Team Updated if Applicable    []  Pt Declined Scheduling Mammogram    []  Pt Will Schedule with External Provider / Order Routed & Care Team Updated if Applicable              Cervical Cancer Screening []  Pap Smear Scheduled in Primary Care or OBGYN    []  External Records Requested & Care Team Updated if Applicable       []  External Records Uploaded, Care Team Updated, & History Updated if Applicable    []  Patient Declined Scheduling Pap Smear    []  Patient Will Schedule with External Provider & Care Team Updated if Applicable                  Colorectal Cancer Screening []  Colonoscopy Case Request / Referral / Home Test Order Placed    []  External Records Requested & Care Team Updated if Applicable    []  External Records Uploaded, Care Team Updated, & History Updated if Applicable    []  Patient Declined Completing Colon Cancer Screening    []  Patient Will Schedule with External Provider & Care Team Updated if Applicable    []  Fit Kit Mailed (add the SmartPhrase under additional notes)    []  Reminded  Patient to Complete Home Test                Diabetic Eye Exam []  Eye Exam Screening Order Placed    []  Eye Camera Scheduled or Optometry/Ophthalmology Referral Placed    []  External Records Requested & Care Team Updated if Applicable    []  External Records Uploaded, Care Team Updated, & History Updated if Applicable    []  Patient Declined Scheduling Eye Exam    []  Patient Will Schedule with External Provider & Care Team Updated if Applicable             Blood Pressure Control []  Primary Care Follow Up Visit Scheduled     []  Remote Blood Pressure Reading Captured    []  Patient Declined Remote Reading or Scheduling Appt - Escalated to PCP    []  Patient Will Call Back or Send Portal Message with Reading                 HbA1c & Other Labs []  Overdue Lab(s) Ordered    []  Overdue Lab(s) Scheduled    []  External Records Uploaded & Care Team Updated if Applicable    []  Primary Care Follow Up Visit Scheduled     []  Reminded Patient to Complete A1c Home Test    []  Patient Declined Scheduling Labs or Will Call Back to Schedule    []  Patient Will Schedule with External Provider / Order Routed, & Care Team Updated if Applicable           Primary Care Appointment []  Primary Care Appt Scheduled    []  Patient Declined Scheduling or Will Call Back to Schedule    []  Pt Established with External Provider, Updated Care Team, & Informed Pt to Notify Payor if Applicable           Medication Adherence /    Statin Use []  Primary Care Appointment Scheduled    []  Patient Reminded to  Prescription    []  Patient Declined, Provider Notified if Needed    []  Sent Provider Message to Review to Evaluate Pt for Statin, Add Exclusion Dx Codes, Document   Exclusion in Problem List, Change Statin Intensity Level to Moderate or High Intensity if Applicable                Osteoporosis Screening []  Dexa Order Placed    []  Dexa Appointment Scheduled    []  External Records Requested & Care Team Updated    []  External  Records Uploaded, Care Team Updated, & History Updated if Applicable    []  Patient Declined Scheduling Dexa or Will Call Back to Schedule    []  Patient Will Schedule with External Provider / Order Routed & Care Team Updated if Applicable       Additional Notes:

## 2024-05-28 ENCOUNTER — PATIENT OUTREACH (OUTPATIENT)
Facility: HOSPITAL | Age: 64
End: 2024-05-28
Payer: COMMERCIAL

## 2024-05-28 NOTE — PROGRESS NOTES
Population Health Chart Review & Patient Outreach Details  Per [facility] website, insurance is active and pt is listed on the attributed list needing a healthy you performed in   Hy scheduled for 2024    Further Action Needed If Patient Returns Outreach:            Updates Requested / Reviewed:     []  Care Everywhere    []     []  External Sources ([facility], [facility], [facility], etc.)    [] [facility]   [] [facility]   [] Other:    []  Care Team Updated   []  Removed  or Duplicate Orders   []  Immunization Reconciliation Completed / Queried    [] [place]   [] [place]   [] [place]   [] [place]      Health Maintenance Topics Addressed and Outreach Outcomes / Actions Taken:             Breast Cancer Screening []  Mammogram Order Placed    []  Mammogram Screening Scheduled    []  External Records Requested & Care Team Updated if Applicable    []  External Records Uploaded & Care Team Updated if Applicable    []  Pt Declined Scheduling Mammogram    []  Pt Will Schedule with External Provider / Order Routed & Care Team Updated if Applicable              Cervical Cancer Screening []  Pap Smear Scheduled in Primary Care or OBGYN    []  External Records Requested & Care Team Updated if Applicable       []  External Records Uploaded, Care Team Updated, & History Updated if Applicable    []  Patient Declined Scheduling Pap Smear    []  Patient Will Schedule with External Provider & Care Team Updated if Applicable                  Colorectal Cancer Screening []  Colonoscopy Case Request / Referral / Home Test Order Placed    []  External Records Requested & Care Team Updated if Applicable    []  External Records Uploaded, Care Team Updated, & History Updated if Applicable    []  Patient Declined Completing Colon Cancer Screening    []  Patient Will Schedule with External Provider & Care Team Updated if Applicable    []  Fit Kit Mailed (add the SmartPhrase under additional notes)    []  Reminded Patient to Complete  Home Test                Diabetic Eye Exam []  Eye Exam Screening Order Placed    []  Eye Camera Scheduled or Optometry/Ophthalmology Referral Placed    []  External Records Requested & Care Team Updated if Applicable    []  External Records Uploaded, Care Team Updated, & History Updated if Applicable    []  Patient Declined Scheduling Eye Exam    []  Patient Will Schedule with External Provider & Care Team Updated if Applicable             Blood Pressure Control []  Primary Care Follow Up Visit Scheduled     []  Remote Blood Pressure Reading Captured    []  Patient Declined Remote Reading or Scheduling Appt - Escalated to PCP    []  Patient Will Call Back or Send Portal Message with Reading                 HbA1c & Other Labs []  Overdue Lab(s) Ordered    []  Overdue Lab(s) Scheduled    []  External Records Uploaded & Care Team Updated if Applicable    []  Primary Care Follow Up Visit Scheduled     []  Reminded Patient to Complete A1c Home Test    []  Patient Declined Scheduling Labs or Will Call Back to Schedule    []  Patient Will Schedule with External Provider / Order Routed, & Care Team Updated if Applicable           Primary Care Appointment []  Primary Care Appt Scheduled    []  Patient Declined Scheduling or Will Call Back to Schedule    []  Pt Established with External Provider, Updated Care Team, & Informed Pt to Notify Payor if Applicable           Medication Adherence /    Statin Use []  Primary Care Appointment Scheduled    []  Patient Reminded to  Prescription    []  Patient Declined, Provider Notified if Needed    []  Sent Provider Message to Review to Evaluate Pt for Statin, Add Exclusion Dx Codes, Document   Exclusion in Problem List, Change Statin Intensity Level to Moderate or High Intensity if Applicable                Osteoporosis Screening []  Dexa Order Placed    []  Dexa Appointment Scheduled    []  External Records Requested & Care Team Updated    []  External Records Uploaded, Care  Team Updated, & History Updated if Applicable    []  Patient Declined Scheduling Dexa or Will Call Back to Schedule    []  Patient Will Schedule with External Provider / Order Routed & Care Team Updated if Applicable       Additional Notes:

## 2024-05-29 ENCOUNTER — OFFICE VISIT (OUTPATIENT)
Dept: FAMILY MEDICINE | Facility: CLINIC | Age: 64
End: 2024-05-29
Payer: COMMERCIAL

## 2024-05-29 VITALS
OXYGEN SATURATION: 98 % | RESPIRATION RATE: 18 BRPM | HEIGHT: 72 IN | DIASTOLIC BLOOD PRESSURE: 68 MMHG | TEMPERATURE: 98 F | WEIGHT: 193 LBS | BODY MASS INDEX: 26.14 KG/M2 | HEART RATE: 77 BPM | SYSTOLIC BLOOD PRESSURE: 115 MMHG

## 2024-05-29 DIAGNOSIS — T46.6X5A MYALGIA DUE TO STATIN: ICD-10-CM

## 2024-05-29 DIAGNOSIS — Z13.220 SCREENING FOR LIPOID DISORDERS: ICD-10-CM

## 2024-05-29 DIAGNOSIS — M79.10 MYALGIA DUE TO STATIN: ICD-10-CM

## 2024-05-29 DIAGNOSIS — Z13.1 SCREENING FOR DIABETES MELLITUS: ICD-10-CM

## 2024-05-29 DIAGNOSIS — Z00.00 ANNUAL VISIT FOR GENERAL ADULT MEDICAL EXAMINATION WITHOUT ABNORMAL FINDINGS: Primary | ICD-10-CM

## 2024-05-29 LAB
CHOLEST SERPL-MCNC: 184 MG/DL (ref 0–200)
CHOLEST/HDLC SERPL: 4 {RATIO}
GLUCOSE SERPL-MCNC: 92 MG/DL (ref 74–106)
HDLC SERPL-MCNC: 46 MG/DL (ref 40–60)
LDLC SERPL CALC-MCNC: 121 MG/DL
LDLC/HDLC SERPL: 2.6 {RATIO}
NONHDLC SERPL-MCNC: 138 MG/DL
TRIGL SERPL-MCNC: 84 MG/DL (ref 35–150)
VLDLC SERPL-MCNC: 17 MG/DL

## 2024-05-29 PROCEDURE — 99396 PREV VISIT EST AGE 40-64: CPT | Mod: ,,, | Performed by: FAMILY MEDICINE

## 2024-05-29 PROCEDURE — 3078F DIAST BP <80 MM HG: CPT | Mod: ,,, | Performed by: FAMILY MEDICINE

## 2024-05-29 PROCEDURE — 3008F BODY MASS INDEX DOCD: CPT | Mod: ,,, | Performed by: FAMILY MEDICINE

## 2024-05-29 PROCEDURE — 80061 LIPID PANEL: CPT | Mod: ,,, | Performed by: CLINICAL MEDICAL LABORATORY

## 2024-05-29 PROCEDURE — 3074F SYST BP LT 130 MM HG: CPT | Mod: ,,, | Performed by: FAMILY MEDICINE

## 2024-05-29 PROCEDURE — 82947 ASSAY GLUCOSE BLOOD QUANT: CPT | Mod: ,,, | Performed by: CLINICAL MEDICAL LABORATORY

## 2024-05-29 PROCEDURE — 1159F MED LIST DOCD IN RCRD: CPT | Mod: ,,, | Performed by: FAMILY MEDICINE

## 2024-05-29 PROCEDURE — 1160F RVW MEDS BY RX/DR IN RCRD: CPT | Mod: ,,, | Performed by: FAMILY MEDICINE

## 2024-05-29 NOTE — PROGRESS NOTES
Subjective     Patient ID: Neville Polanco is a 63 y.o. male.    Chief Complaint: Healthy You (Patient stated he is here for [facility] Healthy You visit for cholesterol. He is a  and stated he cannot tolerate statins. Stated it makes his muscles hurt and ran out of his medication and his muscle pain stopped. Stated he does not want to get back on statins. ) and Hyperlipidemia    HPI  Review of Systems    Tobacco Use: Medium Risk (5/29/2024)    Patient History     Smoking Tobacco Use: Former     Smokeless Tobacco Use: Never     Passive Exposure: Past     Review of patient's allergies indicates:  No Known Allergies  Current Outpatient Medications   Medication Instructions    acetaminophen-codeine 300-30mg (TYLENOL #3) 300-30 mg Tab 1 tablet, Oral, Every 4 hours PRN    atorvastatin (LIPITOR) 10 mg, Oral, Daily    chlorhexidine (PERIDEX) 0.12 % solution SWISH 1/2 OZ FOR 30 SECONDS TWICE DAILY    esomeprazole (NEXIUM) 20 mg, Oral, Before breakfast    naproxen sodium (ANAPROX) 220 mg, Oral, Every 12 hours PRN    sildenafil (REVATIO) 20 mg Tab TAKE 2-5 TABLETS BY MOUTH 1 HOUR PRIOR TO INTERCOURSE (DO NOT TAKE WITH NITROGLYCERIN, NITRATES)    tamsulosin (FLOMAX) 0.4 mg, Oral, Nightly     Medications Discontinued During This Encounter   Medication Reason    sildenafiL (VIAGRA) 100 MG tablet Duplicate Order       Past Medical History:   Diagnosis Date    Benign prostatic hyperplasia with lower urinary tract symptoms     Chronic prostatitis     Elevated PSA     Male erectile dysfunction, unspecified      Health Maintenance Topics with due status: Not Due       Topic Last Completion Date    Colorectal Cancer Screening 10/07/2019    Lipid Panel 11/17/2023       There is no immunization history on file for this patient.    Objective     Body mass index is 26.18 kg/m².  Wt Readings from Last 3 Encounters:   05/29/24 87.5 kg (193 lb)   12/22/23 88 kg (194 lb)   12/04/23 88.9 kg (196 lb)     Ht Readings  from Last 3 Encounters:   05/29/24 6' (1.829 m)   12/22/23 6' (1.829 m)   12/04/23 6' (1.829 m)     BP Readings from Last 3 Encounters:   05/29/24 115/68   12/22/23 134/65   12/04/23 128/68     Temp Readings from Last 3 Encounters:   05/29/24 98.1 °F (36.7 °C) (Oral)   12/22/23 98.8 °F (37.1 °C) (Oral)   11/17/23 98 °F (36.7 °C) (Oral)     Pulse Readings from Last 3 Encounters:   05/29/24 77   12/22/23 63   12/04/23 68     Resp Readings from Last 3 Encounters:   05/29/24 18   12/22/23 18   11/17/23 19     PF Readings from Last 3 Encounters:   No data found for PF       Physical Exam    Assessment and Plan     Problem List Items Addressed This Visit    None      Plan: ***      I {HAVE/HAVE NOT:37664} reviewed the medications, allergies, and problem list.     Goal Actions:    What type of visit is the patient here for today?: Healthy You  Does the patient consent to enroll in [facility] Healthy?: Yes  Is this a Wellness Follow Up?: No  What is your overall wellness goal? (select at least one): Lifestyle modifications, Lose weight, Understand disease process, Improve overall health  Choose 3: Biometric, Lifestyle, Nutrition  Biometric Actions: Attend regularly scheduled office visits  Lifestyle Actions : Take medications as prescribed  Nurtrition Actions: Eat a well-balanced diet

## 2024-05-29 NOTE — PATIENT INSTRUCTIONS
"Patient Education       High Cholesterol   The Basics   Written by the doctors and editors at [facility]   What is cholesterol? -- Cholesterol is a substance that is found in the blood. Everyone has some. It is needed for good health. The problem is, people sometimes have too much cholesterol. Compared with people with normal cholesterol, people with high cholesterol have a higher risk of heart attacks, strokes, and other health problems. The higher your cholesterol, the higher your risk of these problems.  Are there different types of cholesterol? -- Yes, there are a few different types. If you get a cholesterol test, you might hear your doctor or nurse talk about:  Total cholesterol  LDL cholesterol - Some people call this the "bad" cholesterol. That's because having high LDL levels raises your risk of heart attacks, strokes, and other health problems.  HDL cholesterol - Some people call this the "good" cholesterol. That's because people with high HDL levels tend to have a lower risk of heart attacks, strokes, and other health problems.   Non-HDL cholesterol - Non-HDL cholesterol is your total cholesterol minus your HDL cholesterol.  Triglycerides - Triglycerides are not cholesterol. They are another type of fat. But they often get measured when cholesterol is measured. (Having high triglycerides also seems to increase the risk of heart attacks and strokes.)  What should my numbers be? -- Ask your doctor or nurse what your numbers should be. Different people need different goals. (If you live outside the [place], see the table (table 1)). In general, people who do not already have heart disease should aim for:  Total cholesterol below 200  LDL cholesterol below 130 - or much lower, if they are at risk of heart attacks or strokes  HDL cholesterol above 60  Non-HDL cholesterol below 160 - or lower, if they are at risk of heart attacks or strokes  Triglycerides below 150  Keep in mind, though, that many people " "who cannot meet these goals still have a low risk of heart attacks and strokes.  What should I do if my doctor tells me I have high cholesterol? -- Ask your doctor what your overall risk of heart attacks and strokes is. High cholesterol, by itself, is not always a reason to worry. Having high cholesterol is just one of many things that can increase your risk of heart attacks and strokes. Other factors that increase your risk include:  Cigarette smoking  High blood pressure  Having a parent, sister, or brother who got heart disease at a young age - Young, in this case, means younger than 55 for men and younger than 65 for women.  A diet that is not heart healthy - A "heart-healthy" diet includes lots of fruits and vegetables, fiber, and healthy fats (like those found in fish and certain oils). It also means limiting sugar and unhealthy fats.  Older age  If you are at high risk of heart attacks and strokes, having high cholesterol is a problem. On the other hand, if you are at low risk, having high cholesterol might not lead to treatment.  Should I take medicine to lower cholesterol? -- Not everyone who has high cholesterol needs medicines. Your doctor or nurse will decide if you need them based on your age, family history, and other health concerns.  You should probably take a cholesterol-lowering medicine called a statin if you:  Already had a heart attack or stroke  Have known heart disease  Have diabetes  Have a condition called peripheral artery disease, which makes it painful to walk, and happens when the arteries in your legs get clogged with fatty deposits  Have an abdominal aortic aneurysm, which is a widening of the main artery in the belly  Most people with any of the conditions listed above should take a statin no matter what their cholesterol level is. If your doctor or nurse puts you on a statin, stay on it. The medicine might not make you feel any different. But it can help prevent heart attacks, " strokes, and death.  Can I lower my cholesterol without medicines? -- Yes, you can lower your cholesterol some by:  Avoiding red meat, butter, fried foods, cheese, and other foods that have a lot of saturated fat  Losing weight (if you are overweight)  Being more active  Even if these steps do little to change your cholesterol, they can improve your health in many ways.  All topics are updated as new evidence becomes available and our peer review process is complete.  This topic retrieved from [employer] on: Sep 21, 2021.  Topic 90481 Version 19.0  Release: 29.4.2 - C29.263  © 2021 UpToDate, Inc. and/or its affiliates. All rights reserved.  table 1: Cholesterol and triglyceride measurements in the [place] and elsewhere     Measurement used within the [place] Milligrams/deciliter (mg/dL)  Measurement used most places outside the [place] Millimoles/liter (mmol/Liter)     Level to aim for  Level to aim for    Total cholesterol  Below 200 Below 5.17   LDL cholesterol  Below 130 - or much lower if at risk of heart attack and stroke Below 3.36 - or much lower if at risk of heart attack and stroke   HDL cholesterol  Above 60 Above 1.55   Triglycerides  Below 150 Below 1.7   Cholesterol is measured differently in the [place] than it is in most other countries. This table shows values used within and outside the [place]. It includes the cholesterol and triglyceride levels that most people who do not have heart disease should aim for.  Graphic 96371 Version 3.0  Consumer Information Use and Disclaimer   This information is not specific medical advice and does not replace information you receive from your health care provider. This is only a brief summary of general information. It does NOT include all information about conditions, illnesses, injuries, tests, procedures, treatments, therapies, discharge instructions or life-style choices that may apply to you. You must talk with your health care  provider for complete information about your health and treatment options. This information should not be used to decide whether or not to accept your health care provider's advice, instructions or recommendations. Only your health care provider has the knowledge and training to provide advice that is right for you. The use of this information is governed by the [employer] End User License Agreement, available at https://www.[employer]/en/solutions/Movidius/about/fredi.The use of [employer] content is governed by the [employer] Terms of Use. ©2021 UpToDate, Inc. All rights reserved.  Copyright   © 2021 UpToDate, Inc. and/or its affiliates. All rights reserved.    Patient Education       Diet and Health   The Basics   Written by the doctors and editors at [facility]   Why is it important to eat a healthy diet? -- It's important to eat a healthy diet because eating the right foods can keep you healthy now and later on in life.  Which foods are especially healthy? -- Foods that are especially healthy include:  Fruits and vegetables - Eating a diet with lots of fruits and vegetables can help prevent heart disease and strokes. It might also help prevent certain types of cancers. Try to eat fruits and vegetables at each meal and also for snacks. If you don't have fresh fruits and vegetables available, you can eat frozen or canned ones instead. Doctors recommend eating at least 2 1/2 servings of vegetables and 2 servings of fruits each day.  Foods with fiber - Eating foods with a lot of fiber can help prevent heart disease and strokes. If you have type 2 diabetes, it can also help control your blood sugar. Foods that have a lot of fiber include vegetables, fruits, beans, nuts, oatmeal, and whole grain breads and cereals. You can tell how much fiber is in a food by reading the nutrition label (figure 1). Doctors recommend eating 25 to 36 grams of fiber each day.  Foods with folate (also called folic acid) - Folate is a  "vitamin that is important for pregnant people, since it helps prevent certain birth defects. Anyone who could get pregnant should get at least 400 micrograms of folic acid daily, whether or not they are actively trying to get pregnant. Folate is found in many breakfast cereals, oranges, orange juice, and green leafy vegetables.  Foods with calcium and vitamin D - Babies, children, and adults need calcium and vitamin D to help keep their bones strong. Adults also need calcium and vitamin D to help prevent osteoporosis. Osteoporosis is a condition that causes bones to get "thin" and break more easily than usual. Different foods and drinks have calcium and vitamin D in them (figure 2). People who don't get enough calcium and vitamin D in their diet might need to take a supplement.  Foods with protein - Protein helps your muscles stay strong. Healthy foods with a lot of protein include chicken, fish, eggs, beans, nuts, and soy products. Red meat also has a lot of protein, but it also contains fats, which can be unhealthy.  Some experts recommend a "Mediterranean diet." This involves eating a lot of fruits, vegetables, nuts, whole grains, and olive oil. It also includes some fish, poultry, and dairy products, but not a lot of red meat. Eating this way can help your overall health, and might even lower your risk of having a stroke.  What foods should I avoid or limit? -- To eat a healthy diet, there are some things you should avoid or limit. They include:   Fats - There are different types of fats. Some types of fats are better for your body than others.  Trans fats are especially unhealthy. They are found in margarines, many fast foods, and some store-bought baked goods. Trans fats can raise your cholesterol level and your increase your chance of getting heart disease. You should avoid eating foods with these types of fats.  The type of "polyunsaturated" fats found in fish seems to be healthy and can reduce your chance " "of getting heart disease. Other polyunsaturated fats might also be good for your health. When you cook, it's best to use oils with healthier fats, such as olive oil and canola oil.  Sugar - To have a healthy diet, it's important to limit or avoid sugar, sweets, and refined grains. Refined grains are found in white bread, white rice, most forms of pasta, and most packaged "snack" foods. Whole grains, such as whole-wheat bread and brown rice, have more fiber and are better for your health.  Avoiding sugar-sweetened beverages, like soda and sports drinks, can also help improve your health.  Red meat - Studies have shown that eating a lot of red meat can increase your risk of certain health problems, including heart disease and cancer. You should limit the amount of red meat that you eat.  Can I drink alcohol as part of a healthy diet? -- People who drink a small amount of alcohol each day might have a lower chance of getting heart disease. But drinking alcohol can lead to problems. For example, it can raise a person's chances of getting liver disease and certain types of cancers. In women, even 1 drink a day can increase the risk of getting breast cancer.  Most doctors recommend that adult women not have more than 1 drink a day and that adult men not have more than 2 drinks a day. The limits are different because most women's bodies take longer to break down alcohol.  How many calories do I need each day? -- The number of calories you need each day depends on your weight, height, age, sex, and how active you are.  Your doctor or nurse can tell you how many calories you should eat each day. If you are trying to lose weight, you should eat fewer calories each day.  What if I have questions? -- If you have questions about which foods you should or should not eat, ask your doctor or nurse. The right diet for you will depend, in part, on your health and any medical conditions you have.  All topics are updated as new " "evidence becomes available and our peer review process is complete.  This topic retrieved from [employer] on: Sep 21, 2021.  Topic 50848 Version 20.0  Release: 29.4.2 - C29.263  © 2021 UpToDate, Inc. and/or its affiliates. All rights reserved.  figure 1: Nutrition label - fiber     This is an example of a nutrition label. To figure out how much fiber is in a food, look for the line that says "Dietary Fiber." It's also important to look at the serving size. This food has 7 grams of fiber in each serving, and each serving is 1 cup.  Graphic 48313 Version 7.0    figure 2: Foods and drinks with calcium and vitamin D     Foods rich in calcium include ice cream, soy milk, breads, kale, broccoli, milk, cheese, cottage cheese, almonds, yogurt, ready-to-eat cereals, beans, and tofu. Foods rich in vitamin D include milk, fortified plant-based "milks" (soy, almond), canned tuna fish, cod liver oil, yogurt, ready-to-eat-cereals, cooked salmon, canned sardines, mackerel, and eggs. Some of these foods are rich in both.  Graphic 67599 Version 4.0    Consumer Information Use and Disclaimer   This information is not specific medical advice and does not replace information you receive from your health care provider. This is only a brief summary of general information. It does NOT include all information about conditions, illnesses, injuries, tests, procedures, treatments, therapies, discharge instructions or life-style choices that may apply to you. You must talk with your health care provider for complete information about your health and treatment options. This information should not be used to decide whether or not to accept your health care provider's advice, instructions or recommendations. Only your health care provider has the knowledge and training to provide advice that is right for you. The use of this information is governed by the [employer] End User License Agreement, available at " https://www.[employer].com/en/solutions/lexicomp/about/fredi.The use of [employer] content is governed by the [employer] Terms of Use. ©2021 [employer]. All rights reserved.  Copyright   © 2021 UpToDate, Inc. and/or its affiliates. All rights reserved.

## 2024-05-29 NOTE — PROGRESS NOTES
Subjective     Patient ID: Neville Polanco is a 63 y.o. male.    Chief Complaint: Healthy You (Patient stated he is here for [employer] Healthy You visit for cholesterol. He is a  and stated he cannot tolerate statins. Stated it makes his muscles hurt and ran out of his medication and his muscle pain stopped. Stated he does not want to get back on statins. ) and Hyperlipidemia    Patient is here for a [employer] [facility] healthy you. Patient could not tolerate a statin due to myalgias. The pain cleared up after he stopped the statin.            Review of Systems   Constitutional:  Negative for fatigue and fever.   HENT:  Negative for ear pain, postnasal drip, rhinorrhea and sinus pressure/congestion.    Respiratory:  Negative for cough and shortness of breath.    Cardiovascular:  Negative for chest pain.   Gastrointestinal:  Negative for abdominal pain, diarrhea, nausea and vomiting.   Genitourinary:  Negative for dysuria.   Neurological:  Negative for headaches.       Tobacco Use: Medium Risk (5/29/2024)    Patient History     Smoking Tobacco Use: Former     Smokeless Tobacco Use: Never     Passive Exposure: Past     Review of patient's allergies indicates:   Allergen Reactions    Statins-hmg-coa reductase inhibitors Other (See Comments)     Muscle pain     Current Outpatient Medications   Medication Instructions    acetaminophen-codeine 300-30mg (TYLENOL #3) 300-30 mg Tab 1 tablet, Oral, Every 4 hours PRN    atorvastatin (LIPITOR) 10 mg, Oral, Daily    chlorhexidine (PERIDEX) 0.12 % solution SWISH 1/2 OZ FOR 30 SECONDS TWICE DAILY    esomeprazole (NEXIUM) 20 mg, Oral, Before breakfast    naproxen sodium (ANAPROX) 220 mg, Oral, Every 12 hours PRN    sildenafil (REVATIO) 20 mg Tab TAKE 2-5 TABLETS BY MOUTH 1 HOUR PRIOR TO INTERCOURSE (DO NOT TAKE WITH NITROGLYCERIN, NITRATES)    tamsulosin (FLOMAX) 0.4 mg, Oral, Nightly     Medications Discontinued During This Encounter   Medication Reason     sildenafiL (VIAGRA) 100 MG tablet Duplicate Order       Past Medical History:   Diagnosis Date    Benign prostatic hyperplasia with lower urinary tract symptoms     Chronic prostatitis     Elevated PSA     Male erectile dysfunction, unspecified      Health Maintenance Topics with due status: Not Due       Topic Last Completion Date    Colorectal Cancer Screening 10/07/2019    Lipid Panel 05/29/2024       There is no immunization history on file for this patient.    Objective     Body mass index is 26.18 kg/m².  Wt Readings from Last 3 Encounters:   05/29/24 87.5 kg (193 lb)   12/22/23 88 kg (194 lb)   12/04/23 88.9 kg (196 lb)     Ht Readings from Last 3 Encounters:   05/29/24 6' (1.829 m)   12/22/23 6' (1.829 m)   12/04/23 6' (1.829 m)     BP Readings from Last 3 Encounters:   05/29/24 115/68   12/22/23 134/65   12/04/23 128/68     Temp Readings from Last 3 Encounters:   05/29/24 98.1 °F (36.7 °C) (Oral)   12/22/23 98.8 °F (37.1 °C) (Oral)   11/17/23 98 °F (36.7 °C) (Oral)     Pulse Readings from Last 3 Encounters:   05/29/24 77   12/22/23 63   12/04/23 68     Resp Readings from Last 3 Encounters:   05/29/24 18   12/22/23 18   11/17/23 19     PF Readings from Last 3 Encounters:   No data found for PF       Physical Exam  HENT:      Head: Normocephalic and atraumatic.   Cardiovascular:      Rate and Rhythm: Normal rate and regular rhythm.   Pulmonary:      Effort: Pulmonary effort is normal.      Breath sounds: Normal breath sounds.   Neurological:      Mental Status: He is alert and oriented to person, place, and time.   Psychiatric:         Mood and Affect: Mood normal.         Behavior: Behavior normal.         Assessment and Plan     Problem List Items Addressed This Visit          Orthopedic    Myalgia due to statin     Other Visit Diagnoses       Annual visit for general adult medical examination without abnormal findings    -  Primary    Screening for diabetes mellitus        Relevant Orders    Glucose,  Random (Completed)    Screening for lipoid disorders        Relevant Orders    Lipid Panel (Completed)            Plan:  Labs done. Educational material given.      I have reviewed the medications, allergies, and problem list.     Goal Actions:    What type of visit is the patient here for today?: Healthy You  Does the patient consent to enroll in [facility] Healthy?: Yes  Is this a Wellness Follow Up?: No  What is your overall wellness goal? (select at least one): Lifestyle modifications, Lose weight, Understand disease process, Improve overall health  Choose 3: Biometric, Lifestyle, Nutrition  Biometric Actions: Attend regularly scheduled office visits  Lifestyle Actions : Take medications as prescribed  Nurtrition Actions: Eat a well-balanced diet

## 2024-06-04 ENCOUNTER — OFFICE VISIT (OUTPATIENT)
Dept: UROLOGY | Facility: CLINIC | Age: 64
End: 2024-06-04
Payer: COMMERCIAL

## 2024-06-04 VITALS
TEMPERATURE: 97 F | BODY MASS INDEX: 26.41 KG/M2 | HEIGHT: 72 IN | DIASTOLIC BLOOD PRESSURE: 65 MMHG | WEIGHT: 195 LBS | OXYGEN SATURATION: 96 % | HEART RATE: 65 BPM | SYSTOLIC BLOOD PRESSURE: 135 MMHG

## 2024-06-04 DIAGNOSIS — N52.9 ERECTILE DYSFUNCTION, UNSPECIFIED ERECTILE DYSFUNCTION TYPE: ICD-10-CM

## 2024-06-04 DIAGNOSIS — N40.1 BPH WITH URINARY OBSTRUCTION: ICD-10-CM

## 2024-06-04 DIAGNOSIS — R97.20 ELEVATED PSA: Primary | ICD-10-CM

## 2024-06-04 DIAGNOSIS — N13.8 BPH WITH URINARY OBSTRUCTION: ICD-10-CM

## 2024-06-04 DIAGNOSIS — N41.1 CHRONIC PROSTATITIS: ICD-10-CM

## 2024-06-04 PROCEDURE — 1159F MED LIST DOCD IN RCRD: CPT | Mod: S$GLB,,, | Performed by: UROLOGY

## 2024-06-04 PROCEDURE — 99214 OFFICE O/P EST MOD 30 MIN: CPT | Mod: PBBFAC | Performed by: UROLOGY

## 2024-06-04 PROCEDURE — 3075F SYST BP GE 130 - 139MM HG: CPT | Mod: S$GLB,,, | Performed by: UROLOGY

## 2024-06-04 PROCEDURE — 99213 OFFICE O/P EST LOW 20 MIN: CPT | Mod: S$GLB,,, | Performed by: UROLOGY

## 2024-06-04 PROCEDURE — 1160F RVW MEDS BY RX/DR IN RCRD: CPT | Mod: S$GLB,,, | Performed by: UROLOGY

## 2024-06-04 PROCEDURE — 3008F BODY MASS INDEX DOCD: CPT | Mod: S$GLB,,, | Performed by: UROLOGY

## 2024-06-04 PROCEDURE — 3078F DIAST BP <80 MM HG: CPT | Mod: S$GLB,,, | Performed by: UROLOGY

## 2024-06-04 RX ORDER — TAMSULOSIN HYDROCHLORIDE 0.4 MG/1
0.4 CAPSULE ORAL NIGHTLY
Qty: 90 CAPSULE | Refills: 3 | Status: SHIPPED | OUTPATIENT
Start: 2024-06-04 | End: 2025-06-04

## 2024-06-04 NOTE — PROGRESS NOTES
Subjective     Patient ID: Neville Polanco is a 63 y.o. male.    Chief Complaint: Follow-up (Six month PSA and office visit. )    History of Present Illness  Mr. Polanco is 53 years old. He is a  with silicosis of the right lung. Sent to me by Dr. Sanjana Mclean for high normal PSA and low normal testosterone values. Main complaint is lack of energy. This is something that seems to have started only in the relatively recent past. PSA was the first one he ever had done and was on June 8; it was right at the upper end of normal at 3.110, which is right at the upper end of normal. His testosterone was 335 which is lower end of normal. Neither study has been repeated for documentation. There is no history of urinary tract infections, stones, hematuria, or any other urinary tract problems. To his knowledge there  is no family history of prostate cancer.  (August 6, 2014)     Mr. Polanco is brought back today for 6 month PSA since the PSA he had in June was in the very high normal range. We did repeat his testosterone on the above visit and it is normal at 304. The previous one had been 335. These are in the low normal range but are normal. No improvement in energy level. No new medical problems.  (December 15, 2014)     Mr. Polanco is basically the same as he was one year ago. In for his yearly checkup and his PSA is stable at 2.54. Testosterone is still technically normal at 278.1. He  has no significant energy level and loss of libido. He has very minimal sexual function. He would like to try PDE 5 inhibitors to help.  (December 15, 2015)     Mr. Polanco has had Good year in general and is voiding as well as a year ago. PSA has jumped up to 4.770 which is surprising because he is having no symptoms of prostate infection, UTIs, or any other increased problems.  (February 7, 2017)     Mr. Polanco returned for follow-up today with PSA. Was treated with Cipro on last visit but was only able to take about half  of that. He developed some rash on the parts of his body exposed to sunlight and may have had a photosensitivity related to taking medication. I don't think he had a true reaction to the drug. He only took about half. Doing okay now and PSA down slightly but still elevated for age. Voiding is stable. He did not get sildenafil filled because his drugstore apparently did not have it. Needs new prescription he can take around and get the best price.  (May 9, 2017)     Mr. Polanco was brought back because of PSA elevation. PSA today back up to 4.570. Clinically he is doing okay without any increase in voiding difficulty but definitely has elevated PSA. Probably needs to consider biopsies. (August 14 2017)     Mr. Polanco  returned today for follow-up of his chronically elevated PSA. He had biopsies on last visit and it showed acute and chronic prostatitis without any significant dysplasia, PIN, other premalignant changes. He did try the generic sildenafi and it did work satisfactorily for him. Doing okay clinically without any worsening bladder outlet obstructive symptoms or other difficulty. Patient waited to L5 hours for PSA to be returned and it never returned while he was here. Subsequently returned after he left as 3.520. Patient notified by phone.  (February 14, 2018)     Mr. Polanco is in for six-month follow-up of chronic PSA elevation. His PSA was actually down slightly last time. He's had a good 6 months with no increasing problems with voiding or other difficulties. PSA is pending.  (August 24, 2018)     Mr. Polanco has had a good 6 months overall as far as health problems are concerned. PSA back up slightly to 4.21 but has been riding a tractor for the last couple weeks working very hard. Certainly that could be a factor. No worsening bladder outlet obstructive symptoms or other new problems.  (February 25, 2019)     Mr. Polanco is in for six-month follow-up of PSA elevation. He had biopsies of his prostate  "2 years ago. No increased trouble with voiding. Overall has had a good 6 months. A little bit of bleeding from his rectum recently and feels he might have a "polyp" but really has never had a colonoscopy and needs to have that checked because of the bleeding. PSA returned as 5.670. Has been 2 years since his biopsies. He probably needs to have additional biopsies or MRI of the prostate. He would prefer to have an MRI first before additional biopsies. (August 26, 2019)     Mr. Polanco is in for six-month follow-up of chronic PSA elevation. He had his MRI of the prostate done on October 1 at [facility]. This revealed only PI-rads 2 lesion and additional biopsies were not needed. He is stable with voiding. Had a PSA done today that is pending. He's had no further rectal bleeding. Weight is exactly the same as was 6 months ago. No new health issues. (April 20, 2020)     Mr. Polanco is in for six-month follow-up of chronic PSA elevation. Had MRI of the prostate last year and did not reveal suspicious lesions. He is stable with voiding in general and has no worsening bladder outlet obstructive symptoms. He feels he does not need anything to help with voiding. Stable overall and has had a good 6 months. (October 27, 2020)  ------------------------------------------------------------------------------------------------------------------------------------------------------------     PSA Results:  Past PSA Results   PSA was 4.870 on October 27, 2020, Past PSA Results   PSA was 3.750 on August 24, 2018, Past PSA Results   PSA was 3.520 on February 14, 2018  PSA was 4.570 on 8/14/2017  4.090 on May 9, 2017  4.770 on February 7, 2017  2.540 on December 15, 2015  2.490 on December 15, 2014  3.110 on June 8, 2014  -------------------------------------------------------------------------------------------------------------------------------------------------------------------------------------------     Mr. Polanco is in for " six-month follow-up of chronic PSA elevation.  He is doing fine clinically with no worsening bladder outlet obstructive symptoms.  PSA pending when he was here.  One previous negative biopsy in 1 MRI that did not reveal suspicious lesions.  PSA returned after he left and is up slightly to 5.490. (April 27, 2021)     PSA was 5.490 on April 27, 2021  PSA was 5.010 on November 1, 2021   PSA was 6.120 on May 2, 2022  PSA was 4.870 on November PSA was 3.820 8, 2022  PSA was 6.130 on May 8, 2023  PSA was 3.820 on December 4, 2023  PSA was 4.820 on June 4, 2024     Mr. Polanco is in for 6 month follow-up of PSA elevation.  He has had a good 6 months with no worsening bladder outlet obstructive symptoms.  He has had no new health issues that he is aware of.  He had his PSA drawn and results are pending.(November 1, 2021)     Mr. Polanco is in for his 6 monthFollow-up of PSA elevation.  PSA drawn earlier today and is pending.  He has had a reasonably good 6 months except when he aspirated some black mold causing acute pulmonary problems.  That problem is better now.  No new  issues.  Voiding as well as he did last time he was here. (May 2, 2022)     Mr. Poalnco is in for 6 month follow-up of chronic PSA elevation.  Doing okay clinically with no worsening bladder outlet obstructive symptoms and no new health issues.  3 lb weight gain.  PSA drawn and pending. [November 8, 2022]      Mr. Polanco is in for follow-up of his chronic PSA elevation.  No worsening of bladder outlet obstructive symptoms.  He has regained weight he had lost last time.  He has had no new health issues.  PSA reported just as patient was leaving office and it was reported to him as 6.130.  That is essentially the same as it was 1 year ago.  [May 8, 2023]     Mr. Polanco is in for follow-up of chronic PSA elevation.  Clinically doing okay with gradual worsening bladder outlet obstruction.  He is agreeable to trying Medication to see if it will help with  bladder emptying. PSA drawn and pending. [December 4, 2023]    Mr. Polanco is in for follow-up of chronic PSA elevation.  PSA drawn and results pending.  He has improved with voiding since he added tamsulosin to the regimen on last visit.  Usually has nocturia times 0 so he feels that has made significant progress.  No new health problems otherwise. [June 4, 2024]    Review of Systems       Objective     Physical Exam  Constitutional:       Appearance: Normal appearance. He is normal weight.   Genitourinary:     Prostate: Normal.      Rectum: Normal.      Comments: Patient feels he is doing well.  Tamsulosin renewed for a year  PSA returned after he left the office and is in his normal range at 4.820.  He was notified of results by telephone.  Patient will be continued on conservative treatment for the PSA elevation.  He understands I plan to retire.  Appointment with Mr. Lara in 6 months with PSA  Neurological:      Mental Status: He is alert.   Psychiatric:         Mood and Affect: Mood normal.         Behavior: Behavior normal.     Urinalysis revealed only occasional pus cells.  The dipstick negative with pH 5.0 and specific gravity 1.015     Assessment and Plan     1. Elevated PSA  -     PSA, Total (Diagnostic); Future; Expected date: 12/04/2024    2. BPH with urinary obstruction  -     tamsulosin (FLOMAX) 0.4 mg Cap; Take 1 capsule (0.4 mg total) by mouth nightly.  Dispense: 90 capsule; Refill: 3    3. Chronic prostatitis    4. Erectile dysfunction, unspecified erectile dysfunction type      Patient feels he is doing well.  Tamsulosin renewed for a year  PSA returned after he left the office and is in his normal range at 4.820.  He was notified of results by telephone.  Patient will be continued on conservative treatment for the PSA elevation.  He understands I plan to retire.  Appointment with Mr. Lara in 6 months with PSA          No follow-ups on file.

## 2024-06-10 ENCOUNTER — PATIENT OUTREACH (OUTPATIENT)
Facility: HOSPITAL | Age: 64
End: 2024-06-10
Payer: COMMERCIAL

## 2024-06-10 NOTE — PROGRESS NOTES
Population Health Chart Review & Patient Outreach Details  Per [facility] website, insurance is active and patient is enrolled in CM:  CM! Provider CM! Benefit Start Date CMH! Benefit End Date Baseline Risk Track(s) Baseline Risk Level Current Risk Tracks(s) Current Risk Level   FARIDA BRAY MD 2024 Cholesterol Low Cholesterol Low   Color Me Healthy! Services Guide   Color Me Healthy! Services  CPT Codes     Lipid Profile  (0 of 2 Completed) View CPT Codes »    Metabolic Visit  (0 of 2 Completed) View CPT Codes »    Medication Monitoring-Renal  (0 of 2 Completed) View CPT Codes »    Medication Monitoring-Liver  (0 of 2 Completed) View CPT Codes »    Venipuncture  (0 of 2 Completed) View CPT Codes »      Further Action Needed If Patient Returns Outreach:            Updates Requested / Reviewed:     []  Care Everywhere    []     []  External Sources ([facility], [facility], DIS, etc.)    [] [facility]   [] [facility]   [] Other:    []  Care Team Updated   []  Removed  or Duplicate Orders   []  Immunization Reconciliation Completed / Queried    [] [place]   [] [place]   [] [place]   [] [place]      Health [facility] Topics Addressed and Outreach Outcomes / Actions Taken:             Breast Cancer Screening []  Mammogram Order Placed    []  Mammogram Screening Scheduled    []  External Records Requested & Care Team Updated if Applicable    []  External Records Uploaded & Care Team Updated if Applicable    []  Pt Declined Scheduling Mammogram    []  Pt Will Schedule with External Provider / Order Routed & Care Team Updated if Applicable              Cervical Cancer Screening []  Pap Smear Scheduled in Primary Care or OBGYN    []  External Records Requested & Care Team Updated if Applicable       []  External Records Uploaded, Care Team Updated, & History Updated if Applicable    []  Patient Declined Scheduling Pap Smear    []  Patient Will Schedule with External Provider & Care Team Updated if  Applicable                  Colorectal Cancer Screening []  Colonoscopy Case Request / Referral / Home Test Order Placed    []  External Records Requested & Care Team Updated if Applicable    []  External Records Uploaded, Care Team Updated, & History Updated if Applicable    []  Patient Declined Completing Colon Cancer Screening    []  Patient Will Schedule with External Provider & Care Team Updated if Applicable    []  Fit Kit Mailed (add the SmartPhrase under additional notes)    []  Reminded Patient to Complete Home Test                Diabetic Eye Exam []  Eye Exam Screening Order Placed    []  Eye Camera Scheduled or Optometry/Ophthalmology Referral Placed    []  External Records Requested & Care Team Updated if Applicable    []  External Records Uploaded, Care Team Updated, & History Updated if Applicable    []  Patient Declined Scheduling Eye Exam    []  Patient Will Schedule with External Provider & Care Team Updated if Applicable             Blood Pressure Control []  Primary Care Follow Up Visit Scheduled     []  Remote Blood Pressure Reading Captured    []  Patient Declined Remote Reading or Scheduling Appt - Escalated to PCP    []  Patient Will Call Back or Send Portal Message with Reading                 HbA1c & Other Labs []  Overdue Lab(s) Ordered    []  Overdue Lab(s) Scheduled    []  External Records Uploaded & Care Team Updated if Applicable    []  Primary Care Follow Up Visit Scheduled     []  Reminded Patient to Complete A1c Home Test    []  Patient Declined Scheduling Labs or Will Call Back to Schedule    []  Patient Will Schedule with External Provider / Order Routed, & Care Team Updated if Applicable           Primary Care Appointment []  Primary Care Appt Scheduled    []  Patient Declined Scheduling or Will Call Back to Schedule    []  Pt Established with External Provider, Updated Care Team, & Informed Pt to Notify Payor if Applicable           Medication Adherence /    Statin Use []   Primary Care Appointment Scheduled    []  Patient Reminded to  Prescription    []  Patient Declined, Provider Notified if Needed    []  Sent Provider Message to Review to Evaluate Pt for Statin, Add Exclusion Dx Codes, Document   Exclusion in Problem List, Change Statin Intensity Level to Moderate or High Intensity if Applicable                Osteoporosis Screening []  Dexa Order Placed    []  Dexa Appointment Scheduled    []  External Records Requested & Care Team Updated    []  External Records Uploaded, Care Team Updated, & History Updated if Applicable    []  Patient Declined Scheduling Dexa or Will Call Back to Schedule    []  Patient Will Schedule with External Provider / Order Routed & Care Team Updated if Applicable       Additional Notes:

## 2024-08-12 ENCOUNTER — TELEPHONE (OUTPATIENT)
Dept: FAMILY MEDICINE | Facility: CLINIC | Age: 64
End: 2024-08-12
Payer: COMMERCIAL

## 2024-08-12 NOTE — TELEPHONE ENCOUNTER
----- Message from Sheila Jin sent at 8/12/2024 12:26 PM CDT -----  Pt believes he his developing appendicitis and wants to speak with a nurse. Please call him back @ [phone]                                                                                                   Patient called saying he was having pain under right rib cage. Has history of diverticular disease. Last C-Scope 2019. He needs another one set up when Dr Chris Bar returns back. 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 Instructed patient to either come to clinic as walk in or go to the ER if pain worsen. Stated been going on for 2 weeks then pain stops and returned back again today.

## 2024-10-17 ENCOUNTER — PATIENT OUTREACH (OUTPATIENT)
Facility: HOSPITAL | Age: 64
End: 2024-10-17
Payer: COMMERCIAL

## 2024-10-17 NOTE — PROGRESS NOTES
Population Health Chart Review & Patient Outreach Details  Per [facility] website, insurance is active and pt is listed on the attributed list needing a healthy you performed in   HY scheduled for 24  [id number] scheduled     Further Action Needed If Patient Returns Outreach:            Updates Requested / Reviewed:     []  Care Everywhere    []     []  External Sources ([facility], [facility], DIS, etc.)    [] [facility]   [] [facility]   [] Other:    []  Care Team Updated   []  Removed  or Duplicate Orders   []  Immunization Reconciliation Completed / Queried    [] [place]   [] [place]   [] [place]   [] [place]      Health Maintenance Topics Addressed and Outreach Outcomes / Actions Taken:             Breast Cancer Screening []  Mammogram Order Placed    []  Mammogram Screening Scheduled    []  External Records Requested & Care Team Updated if Applicable    []  External Records Uploaded & Care Team Updated if Applicable    []  Pt Declined Scheduling Mammogram    []  Pt Will Schedule with External Provider / Order Routed & Care Team Updated if Applicable              Cervical Cancer Screening []  Pap Smear Scheduled in Primary Care or OBGYN    []  External Records Requested & Care Team Updated if Applicable       []  External Records Uploaded, Care Team Updated, & History Updated if Applicable    []  Patient Declined Scheduling Pap Smear    []  Patient Will Schedule with External Provider & Care Team Updated if Applicable                  Colorectal Cancer Screening []  Colonoscopy Case Request / Referral / Home Test Order Placed    []  External Records Requested & Care Team Updated if Applicable    []  External Records Uploaded, Care Team Updated, & History Updated if Applicable    []  Patient Declined Completing Colon Cancer Screening    []  Patient Will Schedule with External Provider & Care Team Updated if Applicable    []  Fit Kit Mailed (add the SmartPhrase under additional notes)    []  Reminded  Patient to Complete Home Test                Diabetic Eye Exam []  Eye Exam Screening Order Placed    []  Eye Camera Scheduled or Optometry/Ophthalmology Referral Placed    []  External Records Requested & Care Team Updated if Applicable    []  External Records Uploaded, Care Team Updated, & History Updated if Applicable    []  Patient Declined Scheduling Eye Exam    []  Patient Will Schedule with External Provider & Care Team Updated if Applicable             Blood Pressure Control []  Primary Care Follow Up Visit Scheduled     []  Remote Blood Pressure Reading Captured    []  Patient Declined Remote Reading or Scheduling Appt - Escalated to PCP    []  Patient Will Call Back or Send Portal Message with Reading                 HbA1c & Other Labs []  Overdue Lab(s) Ordered    []  Overdue Lab(s) Scheduled    []  External Records Uploaded & Care Team Updated if Applicable    []  Primary Care Follow Up Visit Scheduled     []  Reminded Patient to Complete A1c Home Test    []  Patient Declined Scheduling Labs or Will Call Back to Schedule    []  Patient Will Schedule with External Provider / Order Routed, & Care Team Updated if Applicable           Primary Care Appointment []  Primary Care Appt Scheduled    []  Patient Declined Scheduling or Will Call Back to Schedule    []  Pt Established with External Provider, Updated Care Team, & Informed Pt to Notify Payor if Applicable           Medication Adherence /    Statin Use []  Primary Care Appointment Scheduled    []  Patient Reminded to  Prescription    []  Patient Declined, Provider Notified if Needed    []  Sent Provider Message to Review to Evaluate Pt for Statin, Add Exclusion Dx Codes, Document   Exclusion in Problem List, Change Statin Intensity Level to Moderate or High Intensity if Applicable                Osteoporosis Screening []  Dexa Order Placed    []  Dexa Appointment Scheduled    []  External Records Requested & Care Team Updated    []  External  Records Uploaded, Care Team Updated, & History Updated if Applicable    []  Patient Declined Scheduling Dexa or Will Call Back to Schedule    []  Patient Will Schedule with External Provider / Order Routed & Care Team Updated if Applicable       Additional Notes:

## 2024-11-18 ENCOUNTER — OFFICE VISIT (OUTPATIENT)
Dept: FAMILY MEDICINE | Facility: CLINIC | Age: 64
End: 2024-11-18
Payer: COMMERCIAL

## 2024-11-18 VITALS
RESPIRATION RATE: 18 BRPM | TEMPERATURE: 99 F | DIASTOLIC BLOOD PRESSURE: 60 MMHG | BODY MASS INDEX: 26.41 KG/M2 | HEIGHT: 72 IN | OXYGEN SATURATION: 97 % | SYSTOLIC BLOOD PRESSURE: 135 MMHG | HEART RATE: 61 BPM | WEIGHT: 195 LBS

## 2024-11-18 DIAGNOSIS — M79.10 MYALGIA DUE TO STATIN: ICD-10-CM

## 2024-11-18 DIAGNOSIS — Z12.11 ENCOUNTER FOR SCREENING FOR MALIGNANT NEOPLASM OF COLON: ICD-10-CM

## 2024-11-18 DIAGNOSIS — E78.2 MIXED HYPERLIPIDEMIA: Primary | ICD-10-CM

## 2024-11-18 DIAGNOSIS — R13.10 DYSPHAGIA, UNSPECIFIED TYPE: ICD-10-CM

## 2024-11-18 DIAGNOSIS — T46.6X5A MYALGIA DUE TO STATIN: ICD-10-CM

## 2024-11-18 LAB
ALBUMIN SERPL BCP-MCNC: 3.9 G/DL (ref 3.4–4.8)
ALBUMIN/GLOB SERPL: 1.2 {RATIO}
ALP SERPL-CCNC: 85 U/L (ref 40–150)
ALT SERPL W P-5'-P-CCNC: 19 U/L (ref 0–55)
ANION GAP SERPL CALCULATED.3IONS-SCNC: 12 MMOL/L (ref 7–16)
AST SERPL W P-5'-P-CCNC: 28 U/L (ref 5–34)
BILIRUB SERPL-MCNC: 0.5 MG/DL
BUN SERPL-MCNC: 18 MG/DL (ref 8–26)
BUN/CREAT SERPL: 17 (ref 6–20)
CALCIUM SERPL-MCNC: 10 MG/DL (ref 8.8–10)
CHLORIDE SERPL-SCNC: 105 MMOL/L (ref 98–107)
CHOLEST SERPL-MCNC: 210 MG/DL
CHOLEST/HDLC SERPL: 4.7 {RATIO}
CO2 SERPL-SCNC: 27 MMOL/L (ref 23–31)
CREAT SERPL-MCNC: 1.03 MG/DL (ref 0.72–1.25)
EGFR (NO RACE VARIABLE) (RUSH/TITUS): 82 ML/MIN/1.73M2
GLOBULIN SER-MCNC: 3.2 G/DL (ref 2–4)
GLUCOSE SERPL-MCNC: 93 MG/DL (ref 82–115)
HDLC SERPL-MCNC: 45 MG/DL (ref 35–60)
LDLC SERPL CALC-MCNC: 150 MG/DL
LDLC/HDLC SERPL: 3.3 {RATIO}
NONHDLC SERPL-MCNC: 165 MG/DL
POTASSIUM SERPL-SCNC: 4.7 MMOL/L (ref 3.5–5.1)
PROT SERPL-MCNC: 7.1 G/DL (ref 5.8–7.6)
SODIUM SERPL-SCNC: 139 MMOL/L (ref 136–145)
TRIGL SERPL-MCNC: 75 MG/DL (ref 34–140)
VLDLC SERPL-MCNC: 15 MG/DL

## 2024-11-18 PROCEDURE — 99214 OFFICE O/P EST MOD 30 MIN: CPT | Mod: ,,, | Performed by: FAMILY MEDICINE

## 2024-11-18 PROCEDURE — 3008F BODY MASS INDEX DOCD: CPT | Mod: ,,, | Performed by: FAMILY MEDICINE

## 2024-11-18 PROCEDURE — 3075F SYST BP GE 130 - 139MM HG: CPT | Mod: ,,, | Performed by: FAMILY MEDICINE

## 2024-11-18 PROCEDURE — 1160F RVW MEDS BY RX/DR IN RCRD: CPT | Mod: ,,, | Performed by: FAMILY MEDICINE

## 2024-11-18 PROCEDURE — 3078F DIAST BP <80 MM HG: CPT | Mod: ,,, | Performed by: FAMILY MEDICINE

## 2024-11-18 PROCEDURE — 80061 LIPID PANEL: CPT | Mod: ,,, | Performed by: CLINICAL MEDICAL LABORATORY

## 2024-11-18 PROCEDURE — 80053 COMPREHEN METABOLIC PANEL: CPT | Mod: ,,, | Performed by: CLINICAL MEDICAL LABORATORY

## 2024-11-18 PROCEDURE — 1159F MED LIST DOCD IN RCRD: CPT | Mod: ,,, | Performed by: FAMILY MEDICINE

## 2024-11-18 RX ORDER — GUAIFENESIN/PHENYLPROPANOLAMIN
500 EXPECTORANT ORAL DAILY
COMMUNITY

## 2024-11-18 NOTE — PROGRESS NOTES
Subjective     Patient ID: Neville Polanco is a 63 y.o. male.    Chief Complaint: Color Me Healthy (Patient is here for [facility] Me Healthy visit for hyperlipidemia. He drove in from [place] and works as . Does not do the flu vaccine. ) and Hyperlipidemia (Unable to tolerate Statins due to muscle pain. )    Patient is here for a Color Me Healthy for hyperlipidemia. He complains of dysphagia.       Review of Systems   Constitutional:  Negative for fatigue and fever.   HENT:  Negative for ear pain, postnasal drip, rhinorrhea and sinus pressure/congestion.    Respiratory:  Negative for cough and shortness of breath.    Cardiovascular:  Negative for chest pain.   Gastrointestinal:  Negative for abdominal pain, diarrhea, nausea and vomiting.   Genitourinary:  Negative for dysuria.   Neurological:  Negative for headaches.       Tobacco Use: Medium Risk (11/18/2024)    Patient History     Smoking Tobacco Use: Former     Smokeless Tobacco Use: Never     Passive Exposure: Past     Review of patient's allergies indicates:   Allergen Reactions    Statins-hmg-coa reductase inhibitors Other (See Comments)     Muscle pain     Current Outpatient Medications   Medication Instructions    naproxen sodium (ANAPROX) 220 mg, Oral, Every 12 hours PRN    saw palmetto 500 mg, Daily    sildenafil (REVATIO) 20 mg Tab TAKE 2-5 TABLETS BY MOUTH 1 HOUR PRIOR TO INTERCOURSE (DO NOT TAKE WITH NITROGLYCERIN, NITRATES)    tamsulosin (FLOMAX) 0.4 mg, Oral, Nightly     Medications Discontinued During This Encounter   Medication Reason    acetaminophen-codeine 300-30mg (TYLENOL #3) 300-30 mg Tab Patient no longer taking    atorvastatin (LIPITOR) 10 MG tablet Patient no longer taking    chlorhexidine (PERIDEX) 0.12 % solution Patient no longer taking    esomeprazole (NEXIUM) 20 MG capsule Patient no longer taking       Past Medical History:   Diagnosis Date    Benign prostatic hyperplasia with lower urinary tract symptoms     Chronic  prostatitis     Elevated PSA     Male erectile dysfunction, unspecified      Health Maintenance Topics with due status: Not Due       Topic Last Completion Date    Hemoglobin A1c (Diabetic Prevention Screening) 02/03/2023    Lipid Panel 05/29/2024    RSV Vaccine (Age 60+ and Pregnant patients) Not Due       There is no immunization history on file for this patient.    Objective     Body mass index is 26.45 kg/m².  Wt Readings from Last 3 Encounters:   11/18/24 88.5 kg (195 lb)   06/04/24 88.5 kg (195 lb)   05/29/24 87.5 kg (193 lb)     Ht Readings from Last 3 Encounters:   11/18/24 6' (1.829 m)   06/04/24 6' (1.829 m)   05/29/24 6' (1.829 m)     BP Readings from Last 3 Encounters:   11/18/24 135/60   06/04/24 135/65   05/29/24 115/68     Temp Readings from Last 3 Encounters:   11/18/24 98.6 °F (37 °C) (Oral)   06/04/24 97.4 °F (36.3 °C) (Oral)   05/29/24 98.1 °F (36.7 °C) (Oral)     Pulse Readings from Last 3 Encounters:   11/18/24 61   06/04/24 65   05/29/24 77     Resp Readings from Last 3 Encounters:   11/18/24 18   05/29/24 18   12/22/23 18     PF Readings from Last 3 Encounters:   No data found for PF       Physical Exam  HENT:      Head: Normocephalic and atraumatic.   Cardiovascular:      Rate and Rhythm: Normal rate and regular rhythm.   Pulmonary:      Effort: Pulmonary effort is normal.      Breath sounds: Normal breath sounds.   Neurological:      Mental Status: He is alert and oriented to person, place, and time.   Psychiatric:         Mood and Affect: Mood normal.         Behavior: Behavior normal.         Assessment and Plan     Problem List Items Addressed This Visit       Mixed hyperlipidemia - Primary    Relevant Orders    Comprehensive Metabolic Panel    Lipid Panel    Myalgia due to statin     Other Visit Diagnoses       Encounter for screening for malignant neoplasm of colon        Relevant Orders    Colonoscopy    Dysphagia, unspecified type        Relevant Orders    Ambulatory referral/consult  to Gastroenterology            Plan:  Labs done. Educational material given.    Refer to GI for dysphagia.     I have reviewed the medications, allergies, and problem list.     Goal Actions:    What type of visit is the patient here for today?: Color Me Healthy  Which Color Me Healthy program is the patient enrolling in?: Cholesterol  Is this a Wellness Follow Up?: Yes  What is your overall wellness goal? (select at least one): Lifestyle modifications, Lose weight, Understand disease process, Improve overall health  Choose 3: Biometric, Lifestyle, Nutrition  Biometric Actions: Attend regularly scheduled office visits  Lifestyle Actions : Take medications as prescribed  Nurtrition Actions: Eat a well-balanced diet

## 2024-12-18 ENCOUNTER — OFFICE VISIT (OUTPATIENT)
Dept: GASTROENTEROLOGY | Facility: CLINIC | Age: 64
End: 2024-12-18
Payer: COMMERCIAL

## 2024-12-18 VITALS
HEART RATE: 62 BPM | WEIGHT: 199.63 LBS | OXYGEN SATURATION: 96 % | SYSTOLIC BLOOD PRESSURE: 153 MMHG | HEIGHT: 72 IN | BODY MASS INDEX: 27.04 KG/M2 | DIASTOLIC BLOOD PRESSURE: 69 MMHG

## 2024-12-18 DIAGNOSIS — R12 HEARTBURN: ICD-10-CM

## 2024-12-18 DIAGNOSIS — R13.10 DYSPHAGIA, UNSPECIFIED TYPE: Primary | ICD-10-CM

## 2024-12-18 DIAGNOSIS — Z86.0101 PERSONAL HISTORY OF ADENOMATOUS AND SERRATED COLON POLYPS: ICD-10-CM

## 2024-12-18 PROCEDURE — 99999 PR PBB SHADOW E&M-EST. PATIENT-LVL IV: CPT | Mod: PBBFAC,,, | Performed by: NURSE PRACTITIONER

## 2024-12-18 RX ORDER — PANTOPRAZOLE SODIUM 40 MG/1
40 TABLET, DELAYED RELEASE ORAL DAILY
Qty: 30 TABLET | Refills: 2 | Status: SHIPPED | OUTPATIENT
Start: 2024-12-18 | End: 2025-03-18

## 2024-12-18 NOTE — PROGRESS NOTES
"    Neville Polanco is a 64 y.o. male here for Dysphagia        PCP: Fidelia Mclean  Referring Provider: Fidelia Mclean Md  [address] [facility] [place] [id number]     HPI:  Presents for report of dysphagia.  Patient is reporting increased symptoms of dysphagia with solid food.  Reports that he feels that the food is "sticking" in his chest. Endorses chest discomfort when eating.  Reports this dysphagia with all solid foods.  Endorses vomiting to please bolus.  We did discuss small bites, chew food well.  Patient also is reporting increased heartburn and reflux symptoms.  States that he has been taking over-the-counter Nexium Tums and Rolaids.  States that if he bends over that he does have acid reflux.  No hematochezia or melena.  He is prescribed naproxen.  Labs from 11/18/2024 reviewed, liver enzymes are normal.  He has a personal history of colon polyps.  Colonoscopy on 10/07/2019, report reviewed, tubular adenoma removed from the hepatic flexure.  Recommendation to repeat in 5 years.  We will also schedule repeat colonoscopy and give instructions today.          ROS:  Review of Systems   Constitutional:  Negative for activity change, appetite change, fatigue, fever and unexpected weight change.   HENT:  Positive for trouble swallowing.    Gastrointestinal:  Positive for vomiting and reflux. Negative for abdominal distention, abdominal pain, anal bleeding, blood in stool, change in bowel habit, constipation, diarrhea and nausea.   Musculoskeletal:  Negative for gait problem.   Integumentary:  Negative for color change.   Psychiatric/Behavioral:  Negative for sleep disturbance. The patient is not nervous/anxious.           PMHX:  has a past medical history of Benign prostatic hyperplasia with lower urinary tract symptoms, Chronic prostatitis, Elevated PSA, and Male erectile dysfunction, unspecified.    PSHX:  has no past surgical history on file.    PFHX: family " history includes Aneurysm in his mother; COPD in his father.    PSlHX:  reports that he has quit smoking. He has been exposed to tobacco smoke. He has never used smokeless tobacco. He reports that he does not drink alcohol and does not use drugs.        Review of patient's allergies indicates:   Allergen Reactions    Statins-hmg-coa reductase inhibitors Other (See Comments)     Muscle pain       Medication List with Changes/Refills   New Medications    PANTOPRAZOLE (PROTONIX) 40 MG TABLET    Take 1 tablet (40 mg total) by mouth once daily.   Current Medications    NAPROXEN SODIUM (ANAPROX) 220 MG TABLET    Take 1 tablet (220 mg total) by mouth every 12 (twelve) hours as needed (pain).    SAW PALMETTO 500 MG CAPSULE    Take 500 mg by mouth once daily.    SILDENAFIL (REVATIO) 20 MG TAB    TAKE 2-5 TABLETS BY MOUTH 1 HOUR PRIOR TO INTERCOURSE (DO NOT TAKE WITH NITROGLYCERIN, NITRATES)    TAMSULOSIN (FLOMAX) 0.4 MG CAP    Take 1 capsule (0.4 mg total) by mouth nightly.        Objective Findings:  Vital Signs:  BP (!) 153/69   Pulse 62   Ht 6' (1.829 m)   Wt 90.5 kg (199 lb 9.6 oz)   SpO2 96%   BMI 27.07 kg/m²  Body mass index is 27.07 kg/m².    Physical Exam:  Physical Exam  Vitals and nursing note reviewed.   Constitutional:       General: He is not in acute distress.     Appearance: Normal appearance.   HENT:      Mouth/Throat:      Mouth: Mucous membranes are moist.   Cardiovascular:      Rate and Rhythm: Normal rate.   Pulmonary:      Effort: Pulmonary effort is normal.   Abdominal:      General: Bowel sounds are normal. There is no distension.      Palpations: Abdomen is soft. There is no mass.      Tenderness: There is no abdominal tenderness.   Skin:     General: Skin is warm and dry.      Coloration: Skin is not jaundiced or pale.   Neurological:      Mental Status: He is alert and oriented to person, place, and time.   Psychiatric:         Mood and Affect: Mood normal.          Labs:  No results found for:  ""WBC", "HGB", "HCT", "MCV", "RDW", "PLT", "GRAN", "LYMPH", "MONO", "EOS", "BASO"  Lab Results   Component Value Date     11/18/2024    K 4.7 11/18/2024     11/18/2024    CO2 27 11/18/2024    GLU 93 11/18/2024    BUN 18 11/18/2024    CREATININE 1.03 11/18/2024    CALCIUM 10.0 11/18/2024    PROT 7.1 11/18/2024    ALBUMIN 3.9 11/18/2024    BILITOT 0.5 11/18/2024    ALKPHOS 85 11/18/2024    AST 28 11/18/2024    ALT 19 11/18/2024         Imaging: No results found.      Assessment:  Neville Polanco is a 64 y.o. male here with:  1. Dysphagia, unspecified type    2. Heartburn    3. Personal history of adenomatous and serrated colon polyps          Recommendations:  1. Protonix 40 mg daily  2. Avoid spicy, greasy foods  Avoid caffeine, citric acid, chocolate, peppermint, and carbonated drinks  Do not lay down within 3 hours of eating  Exercise 150 minutes per week  Increase fluid to 64 ounces daily  Avoid antiinflammatory medications such as motrin, advil, aleve, ibuprofen, and BC powder  3. Schedule colonoscopy    Portions of this note may have been created with voice recognition software.  Occasional wrong word or "sound a like substitutions may have occurred due to inherent limitations of voice recognition software.  Please read the note carefully and recognize using contexts, where substitutions have occurred.    Diagnosis, risks, benefits, and side effects of any medications and treatment plan were discussed with the patient.  All questions were answered to the satisfaction of the patient, and patient verbalized understanding and agreement to the treatment plan.        Follow up in about 3 months (around 3/18/2025).      Order summary:  Orders Placed This Encounter    pantoprazole (PROTONIX) 40 MG tablet    EGD w Dilation    Colonoscopy       Thank you for allowing me to participate in the care of Neville Polanco.      CRISS Muniz          "

## 2024-12-30 DIAGNOSIS — N40.1 BPH WITH URINARY OBSTRUCTION: ICD-10-CM

## 2024-12-30 DIAGNOSIS — N13.8 BPH WITH URINARY OBSTRUCTION: ICD-10-CM

## 2024-12-30 DIAGNOSIS — N52.9 ERECTILE DYSFUNCTION, UNSPECIFIED ERECTILE DYSFUNCTION TYPE: Primary | ICD-10-CM

## 2024-12-30 RX ORDER — SILDENAFIL 100 MG/1
100 TABLET, FILM COATED ORAL DAILY PRN
Qty: 20 TABLET | Refills: 0 | Status: SHIPPED | OUTPATIENT
Start: 2024-12-30

## 2024-12-30 RX ORDER — SILDENAFIL 100 MG/1
100 TABLET, FILM COATED ORAL DAILY PRN
COMMUNITY
Start: 2024-09-25 | End: 2024-12-30 | Stop reason: SDUPTHER

## 2024-12-31 ENCOUNTER — TELEPHONE (OUTPATIENT)
Dept: UROLOGY | Facility: CLINIC | Age: 64
End: 2024-12-31
Payer: COMMERCIAL

## 2024-12-31 NOTE — TELEPHONE ENCOUNTER
I called and spoke with the pt and informed him that VICTORIA Lara has sent in his Sildenafil to [facility] and reminded him of his follow up appointment on 1/15/25 at 2:20pm.  Pt voiced understanding and said he will keep this appointment.

## 2025-01-10 NOTE — PROGRESS NOTES
Subjective     Patient ID: Neville Polanco is a 64 y.o. male.    Chief Complaint:  Chronic elevated PSA    This pleasant 64-year-old male presents to the clinic for follow-up of chronic elevated PSA, BPH with LUTS and ED. Patient states he is doing good today and denies any new urological complaints.  Records indicate patient had prostate biopsies on August 15, 2017.  The pathology report dated August 15, 2017 showed acute and chronic prostatitis without any significant dysplasia, PIN, other premalignant changes.  He subsequently underwent MRI of the prostate in October 20, 2019 that was resulted as a PI-RADS 2 done at [facility].  Prostate biopsies were not recommended at that time.  The decision was made to do conservative management following the PSA every 6 months.  His last PSA of record was on June 4, 2024 and resulted as 4.820.  He is pleased with the way he is voiding.  He is on Flomax 1 capsule at bedtime.  He is tolerating this medicine without side effects.  His IPSS score is 9 that reflects incomplete bladder emptying, frequency, intermittency, urgency, and weak stream.  His PVR was 147 MLS.  He denies dysuria, hematuria, straining or nocturia.  He denies fever, chills, nausea, or vomiting.  Denies any  pains.  He had no complaint of ED today.  He is on Viagra 100 mg as prescribed.  He is tolerating this medicine without side effects per the patient.  Through shared decision-making with the patient, he desires to continue the Flomax and Viagra.  He desires to continue to monitor the PSA every 6 months.  I discussed the plan in detail with the patient and he is in agreement with the plan.  All his questions were answered at today's visit.  I spent 30 minutes counseling this patient, reviewing the chart, imaging and labs.      PSA history:  PSA was 5.490 on April 27, 2021  PSA was 5.010 on November 1, 2021   PSA was 6.120 on May 2, 2022  PSA was 4.870 on November PSA was 3.820 8, 2022  PSA was  6.130 on May 8, 2023  PSA was 3.820 on December 4, 2023  PSA was 4.820 on June 4, 2024  PSA was 3.599 on January 15, 2024  --------------------------------------------------------------------------------------------------  [January 15, 2024]              Review of Systems   Constitutional:  Negative for activity change and fever.   HENT:  Negative for hearing loss and trouble swallowing.    Eyes:  Negative for visual disturbance.   Respiratory:  Negative for cough, shortness of breath and wheezing.    Cardiovascular:  Negative for chest pain.   Gastrointestinal:  Negative for abdominal pain, diarrhea, nausea and vomiting.   Endocrine: Negative for polyuria.   Genitourinary:  Positive for erectile dysfunction. Negative for bladder incontinence, decreased urine volume, difficulty urinating, discharge, dysuria, enuresis, flank pain, frequency, genital sores, hematuria, penile pain, penile swelling, scrotal swelling, testicular pain and urgency.        Chronic elevated PSA        BPH with LUTS        Chronic prostatitis   Musculoskeletal:  Negative for back pain and gait problem.   Integumentary:  Negative for rash.   Neurological:  Negative for speech difficulty and weakness.   Psychiatric/Behavioral:  Negative for behavioral problems and confusion.           Objective     Physical Exam  Vitals and nursing note reviewed.   Constitutional:       General: He is not in acute distress.     Appearance: Normal appearance. He is not ill-appearing, toxic-appearing or diaphoretic.   HENT:      Head: Normocephalic.   Eyes:      Extraocular Movements: Extraocular movements intact.   Cardiovascular:      Rate and Rhythm: Normal rate and regular rhythm.      Heart sounds: Normal heart sounds.   Pulmonary:      Effort: Pulmonary effort is normal. No respiratory distress.      Breath sounds: Normal breath sounds. No wheezing, rhonchi or rales.   Abdominal:      General: Bowel sounds are normal.      Palpations: Abdomen is soft.       Tenderness: There is no abdominal tenderness. There is no right CVA tenderness, left CVA tenderness, guarding or rebound.   Genitourinary:     Rectum: Normal.      Comments: DOROTEO> 30 g, smooth, symmetrical, nontender, non indurated  Musculoskeletal:         General: Normal range of motion.      Cervical back: Normal range of motion. No rigidity.   Skin:     General: Skin is warm and dry.   Neurological:      General: No focal deficit present.      Mental Status: He is alert and oriented to person, place, and time.      Motor: No weakness.      Coordination: Coordination normal.      Gait: Gait normal.   Psychiatric:         Mood and Affect: Mood normal.         Behavior: Behavior normal.         Thought Content: Thought content normal.          Assessment and Plan     1. Elevated PSA    2. Benign prostatic hyperplasia with incomplete bladder emptying    3. Chronic prostatitis    4. Other male erectile dysfunction    5. Erectile dysfunction, unspecified erectile dysfunction type  -     sildenafiL (VIAGRA) 100 MG tablet; Take 1 tablet (100 mg total) by mouth daily as needed for Erectile Dysfunction (Take 1 tablet (100 mg total) by mouth daily as needed for Erectile Dysfunction.).  Dispense: 30 tablet; Refill: 11             1. PSA in 6 months  2. Renew and continue Viagra 100 mg as prescribed  3. Continue tamsulosin (Flomax) 0.4 mg 1 capsule at bedtime  4. Follow-up with urology in 6 months

## 2025-01-15 ENCOUNTER — OFFICE VISIT (OUTPATIENT)
Dept: UROLOGY | Facility: CLINIC | Age: 65
End: 2025-01-15
Payer: COMMERCIAL

## 2025-01-15 VITALS
TEMPERATURE: 98 F | HEART RATE: 53 BPM | DIASTOLIC BLOOD PRESSURE: 63 MMHG | RESPIRATION RATE: 16 BRPM | BODY MASS INDEX: 27.12 KG/M2 | SYSTOLIC BLOOD PRESSURE: 147 MMHG | WEIGHT: 200 LBS

## 2025-01-15 DIAGNOSIS — N52.8 OTHER MALE ERECTILE DYSFUNCTION: Chronic | ICD-10-CM

## 2025-01-15 DIAGNOSIS — N41.1 CHRONIC PROSTATITIS: ICD-10-CM

## 2025-01-15 DIAGNOSIS — R39.14 BENIGN PROSTATIC HYPERPLASIA WITH INCOMPLETE BLADDER EMPTYING: Chronic | ICD-10-CM

## 2025-01-15 DIAGNOSIS — N52.9 ERECTILE DYSFUNCTION, UNSPECIFIED ERECTILE DYSFUNCTION TYPE: ICD-10-CM

## 2025-01-15 DIAGNOSIS — R97.20 ELEVATED PSA: Primary | Chronic | ICD-10-CM

## 2025-01-15 DIAGNOSIS — N40.1 BENIGN PROSTATIC HYPERPLASIA WITH INCOMPLETE BLADDER EMPTYING: Chronic | ICD-10-CM

## 2025-01-15 PROCEDURE — 1160F RVW MEDS BY RX/DR IN RCRD: CPT | Mod: S$GLB,,, | Performed by: NURSE PRACTITIONER

## 2025-01-15 PROCEDURE — 99214 OFFICE O/P EST MOD 30 MIN: CPT | Mod: S$GLB,,, | Performed by: NURSE PRACTITIONER

## 2025-01-15 PROCEDURE — 3078F DIAST BP <80 MM HG: CPT | Mod: S$GLB,,, | Performed by: NURSE PRACTITIONER

## 2025-01-15 PROCEDURE — 1159F MED LIST DOCD IN RCRD: CPT | Mod: S$GLB,,, | Performed by: NURSE PRACTITIONER

## 2025-01-15 PROCEDURE — 3077F SYST BP >= 140 MM HG: CPT | Mod: S$GLB,,, | Performed by: NURSE PRACTITIONER

## 2025-01-15 PROCEDURE — 3008F BODY MASS INDEX DOCD: CPT | Mod: S$GLB,,, | Performed by: NURSE PRACTITIONER

## 2025-01-15 PROCEDURE — 99999 PR PBB SHADOW E&M-EST. PATIENT-LVL IV: CPT | Mod: PBBFAC,,, | Performed by: NURSE PRACTITIONER

## 2025-01-15 RX ORDER — SILDENAFIL 100 MG/1
100 TABLET, FILM COATED ORAL DAILY PRN
Qty: 30 TABLET | Refills: 11 | Status: SHIPPED | OUTPATIENT
Start: 2025-01-15

## 2025-01-15 NOTE — PATIENT INSTRUCTIONS
1. PSA in 6 months  2. Renew and continue Viagra 100 mg as prescribed  3. Continue tamsulosin (Flomax) 0.4 mg 1 capsule at bedtime  4. Follow-up with urology in 6 months

## 2025-05-13 ENCOUNTER — PATIENT OUTREACH (OUTPATIENT)
Facility: HOSPITAL | Age: 65
End: 2025-05-13
Payer: COMMERCIAL

## 2025-05-22 ENCOUNTER — PATIENT OUTREACH (OUTPATIENT)
Facility: HOSPITAL | Age: 65
End: 2025-05-22
Payer: COMMERCIAL

## 2025-05-22 NOTE — PROGRESS NOTES
Population Health Review...  Per [facility] website, insurance is active and pt is listed on the attributed list needing a healthy you performed in 2025  HY scheduled for 5/28/25

## 2025-05-28 ENCOUNTER — OFFICE VISIT (OUTPATIENT)
Dept: FAMILY MEDICINE | Facility: CLINIC | Age: 65
End: 2025-05-28
Payer: COMMERCIAL

## 2025-05-28 VITALS
BODY MASS INDEX: 26.82 KG/M2 | TEMPERATURE: 97 F | OXYGEN SATURATION: 97 % | HEIGHT: 72 IN | RESPIRATION RATE: 18 BRPM | WEIGHT: 198 LBS | HEART RATE: 66 BPM | DIASTOLIC BLOOD PRESSURE: 83 MMHG | SYSTOLIC BLOOD PRESSURE: 135 MMHG

## 2025-05-28 DIAGNOSIS — M79.10 MYALGIA DUE TO STATIN: ICD-10-CM

## 2025-05-28 DIAGNOSIS — R13.10 DYSPHAGIA, UNSPECIFIED TYPE: ICD-10-CM

## 2025-05-28 DIAGNOSIS — Z13.220 SCREENING FOR LIPOID DISORDERS: ICD-10-CM

## 2025-05-28 DIAGNOSIS — E78.2 MIXED HYPERLIPIDEMIA: ICD-10-CM

## 2025-05-28 DIAGNOSIS — R12 HEARTBURN: ICD-10-CM

## 2025-05-28 DIAGNOSIS — Z00.01 ANNUAL VISIT FOR GENERAL ADULT MEDICAL EXAMINATION WITH ABNORMAL FINDINGS: ICD-10-CM

## 2025-05-28 DIAGNOSIS — T46.6X5A MYALGIA DUE TO STATIN: ICD-10-CM

## 2025-05-28 DIAGNOSIS — Z13.1 SCREENING FOR DIABETES MELLITUS: Primary | ICD-10-CM

## 2025-05-28 LAB
CHOLEST SERPL-MCNC: 221 MG/DL
CHOLEST/HDLC SERPL: 4.5 {RATIO}
EST. AVERAGE GLUCOSE BLD GHB EST-MCNC: 111 MG/DL
HBA1C MFR BLD HPLC: 5.5 %
HDLC SERPL-MCNC: 49 MG/DL (ref 35–60)
LDLC SERPL CALC-MCNC: 149 MG/DL
LDLC/HDLC SERPL: 3 {RATIO}
NONHDLC SERPL-MCNC: 172 MG/DL
TRIGL SERPL-MCNC: 114 MG/DL (ref 34–140)
VLDLC SERPL-MCNC: 23 MG/DL

## 2025-05-28 PROCEDURE — 80061 LIPID PANEL: CPT | Mod: ,,, | Performed by: CLINICAL MEDICAL LABORATORY

## 2025-05-28 PROCEDURE — 3075F SYST BP GE 130 - 139MM HG: CPT | Mod: ,,, | Performed by: FAMILY MEDICINE

## 2025-05-28 PROCEDURE — 83036 HEMOGLOBIN GLYCOSYLATED A1C: CPT | Mod: ,,, | Performed by: CLINICAL MEDICAL LABORATORY

## 2025-05-28 PROCEDURE — 1159F MED LIST DOCD IN RCRD: CPT | Mod: ,,, | Performed by: FAMILY MEDICINE

## 2025-05-28 PROCEDURE — 99396 PREV VISIT EST AGE 40-64: CPT | Mod: ,,, | Performed by: FAMILY MEDICINE

## 2025-05-28 PROCEDURE — 1160F RVW MEDS BY RX/DR IN RCRD: CPT | Mod: ,,, | Performed by: FAMILY MEDICINE

## 2025-05-28 PROCEDURE — 3079F DIAST BP 80-89 MM HG: CPT | Mod: ,,, | Performed by: FAMILY MEDICINE

## 2025-05-28 PROCEDURE — 3008F BODY MASS INDEX DOCD: CPT | Mod: ,,, | Performed by: FAMILY MEDICINE

## 2025-05-28 PROCEDURE — 3044F HG A1C LEVEL LT 7.0%: CPT | Mod: ,,, | Performed by: FAMILY MEDICINE

## 2025-05-28 RX ORDER — NAPROXEN SODIUM 220 MG
220 TABLET ORAL EVERY 12 HOURS PRN
Qty: 180 TABLET | Refills: 1 | Status: SHIPPED | OUTPATIENT
Start: 2025-05-28

## 2025-05-28 RX ORDER — PANTOPRAZOLE SODIUM 40 MG/1
40 TABLET, DELAYED RELEASE ORAL DAILY PRN
Qty: 30 TABLET | Refills: 2 | Status: SHIPPED | OUTPATIENT
Start: 2025-05-28 | End: 2025-08-26

## 2025-05-28 NOTE — PATIENT INSTRUCTIONS
"Patient Education     High cholesterol   The Basics   Written by the doctors and editors at [facility]   What is cholesterol? -- Cholesterol is a substance found in blood. Everyone has some. It is needed for good health. But people sometimes have too much cholesterol.  Compared with people with normal cholesterol, people with high cholesterol have a higher risk of heart attack, stroke, and other health problems. The higher your cholesterol, the higher your risk of these problems.  Are there different types of cholesterol? -- Yes, there are a few different types. If you get a cholesterol test, you might hear your doctor or nurse talk about:   Total cholesterol   LDL cholesterol - Some people call this the "bad" cholesterol. That's because having high LDL levels raises your risk of heart attack, stroke, and other health problems.   HDL cholesterol - Some people call this the "good" cholesterol. That's because people with high HDL levels tend to have a lower risk of heart attack, stroke, and other health problems.   Non-HDL cholesterol - Non-HDL cholesterol is your total cholesterol minus your HDL cholesterol.   Triglycerides - Triglycerides are not cholesterol. They are another type of fat. But they often get measured when cholesterol is measured. (Having high triglycerides also seems to increase the risk of heart attack and stroke.)  What should my numbers be? -- Ask your doctor or nurse what your numbers should be. Different people need different goals. If you live outside of the [place], see the table (table 1).  In general, people who do not already have heart disease should aim for:   Total cholesterol below 200   LDL cholesterol below 130, or much lower if they are at risk of heart attack or stroke   HDL cholesterol above 60   Non-HDL cholesterol below 160, or lower if they are at risk of heart attack or stroke   Triglycerides below 150  Remember, though, that many people who cannot meet these goals still have a low " "risk of heart attack and stroke.  What should I do if I have high cholesterol? -- Ask your doctor what your overall risk of heart attack and stroke is. Just having high cholesterol is not always a reason to worry. Having high cholesterol is just one of many things that can increase your risk of heart attack and stroke.  Other things that increase your risk include:   Smoking   High blood pressure   Having a parent or sibling who got heart disease at a young age - Young, in this case, means younger than 55 for males and younger than 65 for females.   A diet that is not heart healthy - A "heart-healthy" diet includes lots of fruits and vegetables, fiber, and healthy fats (like those found in fish, nuts, and certain oils). It also means limiting sugar and unhealthy fats.   Older age  If you are at high risk of heart attack and stroke, having high cholesterol is a problem. But if you are at low risk, high cholesterol might not need treatment.  Should I take medicine to lower cholesterol? -- Not everyone who has high cholesterol needs medicines. Your doctor or nurse will decide if you need them based on your age, family history, and other health concerns.  There are many different medicines used to lower cholesterol (table 2). Some help your body make less cholesterol. Some keep your body from absorbing cholesterol from foods. Some help your body get rid of cholesterol faster. The medicines most often used to treat high cholesterol are called "statins."  You should probably take a statin if you:   Already had a heart attack or stroke   Have known heart disease   Have diabetes   Have a condition called "peripheral artery disease," which makes it painful to walk, and happens when the arteries in your legs get clogged with fatty deposits   Have an "abdominal aortic aneurysm," which is a widening of the main artery in the belly  Most people with any of the conditions listed above should take a statin no matter what their " "cholesterol level is. If your doctor or nurse prescribes a statin, it's important to keep taking it. The medicine might not make you feel any different. But it can help prevent heart attack, stroke, and death.  If your doctor or nurse recommends taking medicine to help lower your cholesterol, make sure that you know what it is called. Follow all the instructions for how to take it. For example, some medicines work better when you take them in the evening. Some need to be taken with food.  Tell your doctor or nurse if your medicine causes any side effects that bother you. They might be able to switch you to a different medicine.  Can I lower my cholesterol without medicines? -- Yes. You can help lower your cholesterol by doing these things:   You can lower your LDL, or "bad," cholesterol by avoiding red meat, butter, fried foods, cheese, and other foods that have a lot of saturated fat.   You can lower triglycerides by avoiding sugary foods, fried foods, and excess alcohol.   If you have excess weight, it can help to lose weight. Your doctor or nurse can help you do this in a healthy way.   Try to get regular physical activity. Even gentle forms of exercise, like walking, are good for your health.  Even if these steps don't change your cholesterol very much, they can improve your health in many other ways.  All topics are updated as new evidence becomes available and our peer review process is complete.  This topic retrieved from [employer] on: May 30, 2024.  Topic 56603 Version 25.0  Release: 32.5.3 - C32.150  © 2024 UpToDate, Inc. and/or its affiliates. All rights reserved.  table 1: Cholesterol and triglyceride measurements in the [place] and elsewhere     Measurement used within the [place] Milligrams/deciliter (mg/dL)  Measurement used most places outside of the [place] Millimoles/liter (mmol/Liter)     Level to aim for  Level to aim for    Total cholesterol  Below 200 Below 5.17   LDL cholesterol  Below 130, or much lower if at " risk of heart attack and stroke Below 3.36, or much lower if at risk of heart attack and stroke   HDL cholesterol  Above 60 Above 1.55   Triglycerides  Below 150 Below 1.7   Cholesterol is measured differently in the [place] than it is in most other countries. This table shows values used within and outside of the [place]. It includes the cholesterol and triglyceride levels that most people who do not have heart disease should aim for.  Graphic 93893 Version 5.0  table 2: Lipid-lowering medicines  Generic name  Brand name    Statins    Atorvastatin Lipitor   Fluvastatin Lescol, Lescol XL   Lovastatin Mevacor, Altoprev   Pitavastatin Livalo   Pravastatin Pravachol   Rosuvastatin Crestor   Simvastatin Zocor   PCSK9 inhibitors    Alirocumab Praluent   Evolocumab Repatha, Repatha SureClick   Cholesterol absorption inhibitors    Ezetimibe Zetia   Bile acid sequestrants    Cholestyramine Prevalite, Questran, Questran Light   Colesevelam Welchol   Colestipol Colestid   Niacin (nicotinic acid)    Niacin immediate release     Niacin extended release Niaspan   Fibrates    Fenofibrate Fenoglide, Tricor, Triglide, others   Gemfibrozil Lopid   Brand names listed are for medicines available in the [place] and some other countries.  Graphic 19048 Version 7.0  Consumer Information Use and Disclaimer   Disclaimer: This generalized information is a limited summary of diagnosis, treatment, and/or medication information. It is not meant to be comprehensive and should be used as a tool to help the user understand and/or assess potential diagnostic and treatment options. It does NOT include all information about conditions, treatments, medications, side effects, or risks that may apply to a specific patient. It is not intended to be medical advice or a substitute for the medical advice, diagnosis, or treatment of a health care provider based on the health care provider's examination and assessment of a patient's specific and unique circumstances.  "Patients must speak with a health care provider for complete information about their health, medical questions, and treatment options, including any risks or benefits regarding use of medications. This information does not endorse any treatments or medications as safe, effective, or approved for treating a specific patient. UpToDate, Inc. and its affiliates disclaim any warranty or liability relating to this information or the use thereof.The use of this information is governed by the Terms of Use, available at https://www.[employer].com/en/know/clinical-effectiveness-terms. 2024© UpToDate, Inc. and its affiliates and/or licensors. All rights reserved.  Copyright   © 2024 UpToDate, Inc. and/or its affiliates. All rights reserved.  Patient Education     Diet and health   The Basics   Written by the doctors and editors at [employer]   Why is it important to eat a healthy diet? --   It's important to eat a healthy diet because eating the right foods can keep you healthy now and later in life. It can lower the risk of problems like heart disease, diabetes, high blood pressure, and some types of cancer. It can also help you live longer and improve your quality of life.  What kind of diet is best? --   There is no 1 specific diet that experts recommend for everyone. People choose what foods to eat for many different reasons. These include personal preference, culture, Shinto, allergies or intolerances, and nutritional goals. People also need to consider the cost and availability of different foods.  In general, experts recommend a diet that:   Includes lots of vegetables, fruits, beans, nuts, and whole grains   Limits red and processed meats, unhealthy fats, sugar, salt, and alcohol  What are dietary patterns? --   A dietary "pattern" means generally eating certain types of foods while limiting others. Some people need to follow a specific dietary pattern because of their health needs. For example, if you have high " blood pressure, your doctor might recommend a diet low in salt.  If you are trying to improve your health in general, choosing a healthy dietary pattern can help. This does not have to mean being very strict about what you eat or avoid. The goal is to think about getting plenty of healthy foods while limiting less healthy foods.  Examples of dietary patterns include:   Mediterranean diet - This involves eating a lot of fruits, vegetables, nuts, and whole grains, and uses olive oil instead of other fats. It also includes some fish, poultry, and dairy products, but not a lot of red meat. Following this diet can help your overall health, and might even lower your risk of having a stroke.   Plant-based diets - These patterns focus on vegetables, fruits, grains, beans, and nuts. They limit or avoid food that comes from animals, such as meat and dairy. There are different types of plant-based diets, including vegetarian and vegan.   Low-fat diet - A low-fat diet involves limiting calories from fat. This might help some people keep weight off if that is their goal, but it does not have many other health benefits. If you choose to follow a low-fat diet, it is also important to focus on getting lots of whole grains, legumes, fruits, and vegetables. Limit refined grains and sugar.   Low-cholesterol diet - Cholesterol is found in foods with a lot of saturated fat, like red meat, butter, and cheese. A low-cholesterol diet focuses on limiting the amount of cholesterol that you eat. Limiting the cholesterol in your diet can also help lower the amount of unhealthy fats that you eat.  Which foods are especially healthy? --   Foods that are especially healthy include:   Fruits and vegetables - Eating a diet with lots of fruits and vegetables can help prevent heart disease and stroke. It might also help prevent certain types of cancer. Try to eat fruits and vegetables at each meal and also for snacks. If you don't have fresh fruits  "and vegetables available, you can eat frozen or canned ones instead. Doctors recommend eating at least 5 servings of fruits or vegetables each day.   Whole grains - Whole-grain foods include 100 percent whole-wheat bread, steel cut oats, and whole-grain pasta. These are healthier than foods made with "refined" grains, like white bread and white rice. Eating lots of whole grains instead of refined grains has been shown to help with weight control. It can also lower the risk of several health problems, including colon cancer, heart disease, and diabetes. Doctors recommend that most people try to eat 5 to 8 servings of whole-grain, high-fiber foods each day.   Foods with fiber - Eating foods with a lot of fiber can help prevent heart disease and stroke. If you have type 2 diabetes, it can also help control your blood sugar. Foods that have a lot of fiber include vegetables, fruits, beans, nuts, oatmeal, and whole-grain breads and cereals. You can tell how much fiber is in a food by reading the nutrition label (figure 1). Doctors recommend that most people eat about 25 to 34 grams of fiber each day.   Foods with calcium and vitamin D - Babies, children, and adults need calcium and vitamin D to help keep their bones strong. Adults also need calcium and vitamin D to help prevent osteoporosis. Osteoporosis is a condition that causes bones to get "thin" and break more easily than usual. Different foods and drinks have calcium and vitamin D in them (figure 2). People who don't get enough calcium and vitamin D in their diet might need to take a supplement. Doctors recommend that most people have 2 to 3 servings of foods with calcium and vitamin D each day.   Foods with protein - Protein helps your muscles and bones stay strong. Healthy foods with a lot of protein include chicken, fish, eggs, beans, nuts, and soy products. Red meat also has a lot of protein, but it also contains fats, which can be unhealthy. Doctors recommend " "that most people try to eat about 5 servings of protein each day.   Healthy fats - There are different types of fats. Some types of fats are better for your body than others. Healthy fats are "monounsaturated" or "polyunsaturated" fats. These are found in fatty fish, nuts and nut butters, and avocados. Use plant-based oils when cooking. Examples of these oils include olive, canola, safflower, sunflower, and corn oil. Eating foods with healthy fats, while avoiding or limiting foods with unhealthy fats, might lower the risk of heart disease.   Foods with folate - Folate is a vitamin that is important for pregnant people, since it helps prevent certain birth defects. It is also called "folic acid." Anyone who could get pregnant should get at least 400 micrograms of folic acid daily, whether or not they are actively trying to get pregnant. Folate is found in many breakfast cereals, oranges, orange juice, and green leafy vegetables.  What foods should I avoid or limit? --   To eat a healthy diet, there are some things that you should avoid or limit. They include:   Unhealthy fats - "Trans" fats are especially unhealthy. They are found in margarines, many fast foods, and some store-bought baked goods. "Saturated" fats are found in animal products like meats, egg yolks, butter, cheese, and full-fat milk products. Unhealthy fats can raise your cholesterol level and increase your chance of getting heart disease.   Sugar - To have a healthy diet, it's important to limit or avoid added sugar, sweets, and refined grains. Refined grains are found in white bread, white rice, most pastas, and most packaged "snack" foods.  Avoiding sugar-sweetened beverages, like soda and sports drinks, can also help improve your health.  Avoid canned fruits in "heavy" syrup.   Red and processed meats - Studies have shown that eating a lot of red meat can increase your risk of certain health problems, including heart disease and cancer. You should " limit the amount of red meat that you eat. This is also true for processed meats like sausage, hot dogs, and forte.  Can I drink alcohol as part of a healthy diet? --   Not drinking alcohol at all is the healthiest choice. Regular drinking can raise a person's chances of getting liver disease and certain types of cancers. In females, even 1 drink a day can increase the risk of getting breast cancer.  If you do choose to drink, most doctors recommend limiting alcohol to no more than:   1 drink a day for females   2 drinks a day for males  The limits are different because, generally, the female body takes longer to break down alcohol.  How many calories do I need each day? --   Calories give your body energy. The number of calories that you need each day depends on your weight, height, age, sex, and how active you are.  Your doctor or nurse can tell you about how many calories you should eat each day. You can also work with a dietitian (nutrition expert) to learn more about your dietary needs and options.  What if I am having trouble improving my diet? --   It can be hard to change the way that you eat. Remember that even small changes can improve your health.  Here are some tips that might help:   Try to make fruits and vegetables part of every meal. If you don't have fresh fruits and vegetables, frozen or canned are good options. Look for products without added salt or sugar.   Keep a bowl of fruit out for snacking.   When you can, choose whole grains instead of refined grains. Choose chicken, fish, and beans instead of red meat and cheese.   Try to eat prepared and processed foods less often.   Try flavored seltzer or water instead of soda or juice.   When eating at fast food restaurants, look for healthier items, like broiled chicken or salad.  If you have questions about which foods you should or should not eat, ask your doctor, nurse, or dietitian. The right diet for you will depend, in part, on your health and  "any medical conditions you have.  All topics are updated as new evidence becomes available and our peer review process is complete.  This topic retrieved from [employer] on: Jul 13, 2024.  Topic 05488 Version 29.0  Release: 32.6.2 - C32.193  © 2024 UpToDate, Inc. and/or its affiliates. All rights reserved.  figure 1: Nutrition label - Fiber     This is an example of a nutrition label. To figure out how much fiber is in a food, look for the line that says "Dietary Fiber." It's also important to look at the serving size. This food has 7 grams of fiber in each serving, and each serving is 1 cup.  Graphic 41626 Version 8.0  figure 2: Foods and drinks with calcium and vitamin D     Foods rich in calcium include ice cream, soy milk, breads, kale, broccoli, milk, cheese, cottage cheese, almonds, yogurt, ready-to-eat cereals, beans, and tofu. Foods rich in vitamin D include milk, fortified plant-based "milks" (soy, almond), canned tuna fish, cod liver oil, yogurt, ready-to-eat-cereals, cooked salmon, canned sardines, mackerel, and eggs. Some of these foods are rich in both.  Graphic 56612 Version 4.0  Consumer Information Use and Disclaimer   Disclaimer: This generalized information is a limited summary of diagnosis, treatment, and/or medication information. It is not meant to be comprehensive and should be used as a tool to help the user understand and/or assess potential diagnostic and treatment options. It does NOT include all information about conditions, treatments, medications, side effects, or risks that may apply to a specific patient. It is not intended to be medical advice or a substitute for the medical advice, diagnosis, or treatment of a health care provider based on the health care provider's examination and assessment of a patient's specific and unique circumstances. Patients must speak with a health care provider for complete information about their health, medical questions, and treatment options, including " any risks or benefits regarding use of medications. This information does not endorse any treatments or medications as safe, effective, or approved for treating a specific patient. UpToDate, Inc. and its affiliates disclaim any warranty or liability relating to this information or the use thereof.The use of this information is governed by the Terms of Use, available at https://www.[employer].com/en/know/clinical-effectiveness-terms. 2024© UpToDate, Inc. and its affiliates and/or licensors. All rights reserved.  Copyright   © 2024 UpToDate, Inc. and/or its affiliates. All rights reserved.

## 2025-05-28 NOTE — PROGRESS NOTES
Subjective     Patient ID: Neville Polanco is a 64 y.o. male.    Chief Complaint: Healthy You (Patient is here for [facility] Healthy You visit. He is not fasting for labs. He needs refills on some of his medications sent to [facility].  Noted in his EHR patient never followed up with [facility] for his dysphagia.Cancelled his EGD procedure for 1/6/25 due to $6200 Copay. Cancelled his C-Scope for 3/27/25 due to same. (Cost was even higher). He does have an appointment with Dr Chris Alston for BPH/elevated PSA in July. Last PSA was normal 3.5999 on 01/15/2025.) and Gastroesophageal Reflux    Patient is here for a [employer] healthy you. Patient needs refills.           Review of Systems   Constitutional:  Negative for fatigue and fever.   HENT:  Negative for ear pain, postnasal drip, rhinorrhea and sinus pressure/congestion.    Respiratory:  Negative for cough and shortness of breath.    Cardiovascular:  Negative for chest pain.   Gastrointestinal:  Negative for abdominal pain, diarrhea, nausea and vomiting.   Genitourinary:  Negative for dysuria.   Neurological:  Negative for headaches.       Tobacco Use: Medium Risk (5/28/2025)    Patient History     Smoking Tobacco Use: Former     Smokeless Tobacco Use: Never     Passive Exposure: Past     Review of patient's allergies indicates:   Allergen Reactions    Statins-hmg-coa reductase inhibitors Other (See Comments)     Muscle pain     Current Outpatient Medications   Medication Instructions    naproxen sodium (ANAPROX) 220 mg, Oral, Every 12 hours PRN    pantoprazole (PROTONIX) 40 mg, Oral, Daily PRN    saw palmetto 500 mg, Daily    sildenafiL (VIAGRA) 100 mg, Oral, Daily PRN    tamsulosin (FLOMAX) 0.4 mg, Oral, Nightly     Medications Discontinued During This Encounter   Medication Reason    naproxen sodium (ANAPROX) 220 MG tablet Reorder    pantoprazole (PROTONIX) 40 MG tablet Reorder       Past Medical History:   Diagnosis Date    Benign  prostatic hyperplasia with lower urinary tract symptoms     Chronic prostatitis     Elevated PSA     Male erectile dysfunction, unspecified      Health Maintenance Topics with due status: Not Due       Topic Last Completion Date    Hemoglobin A1c (Diabetic Prevention Screening) 05/28/2025    Lipid Panel 05/28/2025    RSV Vaccine (Age 60+ and Pregnant patients) Not Due       There is no immunization history on file for this patient.    Objective     Body mass index is 26.85 kg/m².  Wt Readings from Last 3 Encounters:   05/28/25 89.8 kg (198 lb)   01/15/25 90.7 kg (200 lb)   12/18/24 90.5 kg (199 lb 9.6 oz)     Ht Readings from Last 3 Encounters:   05/28/25 6' (1.829 m)   12/18/24 6' (1.829 m)   11/18/24 6' (1.829 m)     BP Readings from Last 3 Encounters:   05/28/25 135/83   01/15/25 (!) 147/63   12/18/24 (!) 153/69     Temp Readings from Last 3 Encounters:   05/28/25 97.1 °F (36.2 °C) (Oral)   01/15/25 97.7 °F (36.5 °C)   11/18/24 98.6 °F (37 °C) (Oral)     Pulse Readings from Last 3 Encounters:   05/28/25 66   01/15/25 (!) 53   12/18/24 62     Resp Readings from Last 3 Encounters:   05/28/25 18   01/15/25 16   11/18/24 18     PF Readings from Last 3 Encounters:   No data found for PF       Physical Exam  HENT:      Head: Normocephalic and atraumatic.   Cardiovascular:      Rate and Rhythm: Normal rate and regular rhythm.   Pulmonary:      Effort: Pulmonary effort is normal.      Breath sounds: Normal breath sounds.   Neurological:      Mental Status: He is alert and oriented to person, place, and time.   Psychiatric:         Mood and Affect: Mood normal.         Behavior: Behavior normal.         Assessment and Plan     Problem List Items Addressed This Visit       Mixed hyperlipidemia    Myalgia due to statin    Dysphagia    Relevant Medications    pantoprazole (PROTONIX) 40 MG tablet    Heartburn    Relevant Medications    pantoprazole (PROTONIX) 40 MG tablet     Other Visit Diagnoses         Screening for  "diabetes mellitus    -  Primary    Relevant Orders    Hemoglobin A1C (Completed)      Annual visit for general adult medical examination with abnormal findings        Relevant Medications    naproxen sodium (ANAPROX) 220 MG tablet      Screening for lipoid disorders        Relevant Orders    Lipid Panel (Completed)            Plan: Refill medications. Labs done. Educational material given.      Patient refuses colonoscopy because they wanted "too much money" before they would do his procedure.     I have reviewed the medications, allergies, and problem list.     Goal Actions:    What type of visit is the patient here for today?: Healthy You  Does the patient consent to enroll in [facility] Healthy?: Yes  Is this a Wellness Follow Up?: No  What is your overall wellness goal? (select at least one): Lifestyle modifications, Lose weight, Understand disease process, Improve overall health  Choose 3: Biometric, Lifestyle, Nutrition  Biometric Actions: Attend regularly scheduled office visits  Lifestyle Actions : Take medications as prescribed  Nurtrition Actions: Eat a well-balanced diet      "

## 2025-05-29 ENCOUNTER — PATIENT OUTREACH (OUTPATIENT)
Facility: HOSPITAL | Age: 65
End: 2025-05-29
Payer: COMMERCIAL

## 2025-05-29 ENCOUNTER — RESULTS FOLLOW-UP (OUTPATIENT)
Dept: FAMILY MEDICINE | Facility: CLINIC | Age: 65
End: 2025-05-29

## 2025-05-29 NOTE — PROGRESS NOTES
Population Health Chart Review & Patient Outreach Details      Further Action Needed If Patient Returns Outreach:        Health Maintenance Due   Topic Date Due    Hepatitis C Screening  Never done    HIV Screening  Never done    TETANUS VACCINE  Never done    Shingles Vaccine (1 of 2) Never done    Pneumococcal Vaccines (Age 50+) (1 of 1 - PCV) Never done    COVID-19 Vaccine (3 - 2024-25 season) 2024    Colorectal Cancer Screening  10/07/2024          Updates Requested / Reviewed:     []  Care Everywhere    []     []  External Sources ([facility], [facility], [facility], etc.)    [] [facility]   [] [facility]   [] Other:    []  Care Team Updated   []  Removed  or Duplicate Orders   []  Immunization Reconciliation Completed / Queried    [] [place]   [] [place]   [] [place]   [] [place]      Health Maintenance Topics Addressed and Outreach Outcomes / Actions Taken:             Breast Cancer Screening []  Mammogram Order Placed    []  Mammogram Screening Scheduled    []  External Records Requested & Care Team Updated if Applicable    []  External Records Uploaded & Care Team Updated if Applicable    []  Pt Declined Scheduling Mammogram    []  Pt Will Schedule with External Provider / Order Routed & Care Team Updated if Applicable              Cervical Cancer Screening []  Pap Smear Scheduled in Primary Care or OBGYN    []  External Records Requested & Care Team Updated if Applicable       []  External Records Uploaded, Care Team Updated, & History Updated if Applicable    []  Patient Declined Scheduling Pap Smear    []  Patient Will Schedule with External Provider & Care Team Updated if Applicable                  Colorectal Cancer Screening []  Colonoscopy Case Request / Referral / Home Test Order Placed    []  External Records Requested & Care Team Updated if Applicable    []  External Records Uploaded, Care Team Updated, & History Updated if Applicable    []  Patient Declined Completing Colon Cancer  Screening    []  Patient Will Schedule with External Provider & Care Team Updated if Applicable    []  Fit Kit Mailed (add the SmartPhrase under additional notes)    []  Reminded Patient to Complete Home Test                Diabetic Eye Exam []  Eye Exam Screening Order Placed    []  Eye Camera Scheduled or Optometry/Ophthalmology Referral Placed    []  External Records Requested & Care Team Updated if Applicable    []  External Records Uploaded, Care Team Updated, & History Updated if Applicable    []  Patient Declined Scheduling Eye Exam    []  Patient Will Schedule with External Provider & Care Team Updated if Applicable             Blood Pressure Control []  Primary Care Follow Up Visit Scheduled     []  Remote Blood Pressure Reading Captured    []  Patient Declined Remote Reading or Scheduling Appt - Escalated to PCP    []  Patient Will Call Back or Send Portal Message with Reading                 HbA1c & Other Labs []  Overdue Lab(s) Ordered    []  Overdue Lab(s) Scheduled    []  External Records Uploaded & Care Team Updated if Applicable    []  Primary Care Follow Up Visit Scheduled     []  Reminded Patient to Complete A1c Home Test    []  Patient Declined Scheduling Labs or Will Call Back to Schedule    []  Patient Will Schedule with External Provider / Order Routed, & Care Team Updated if Applicable           Primary Care Appointment []  Primary Care Appt Scheduled    []  Patient Declined Scheduling or Will Call Back to Schedule    []  Pt Established with External Provider, Updated Care Team, & Informed Pt to Notify Payor if Applicable           Medication Adherence /    Statin Use []  Primary Care Appointment Scheduled    []  Patient Reminded to  Prescription    []  Patient Declined, Provider Notified if Needed    []  Sent Provider Message to Review to Evaluate Pt for Statin, Add Exclusion Dx Codes, Document   Exclusion in Problem List, Change Statin Intensity Level to Moderate or High Intensity if  Applicable                Osteoporosis Screening []  Dexa Order Placed    []  Dexa Appointment Scheduled    []  External Records Requested & Care Team Updated    []  External Records Uploaded, Care Team Updated, & History Updated if Applicable    []  Patient Declined Scheduling Dexa or Will Call Back to Schedule    []  Patient Will Schedule with External Provider / Order Routed & Care Team Updated if Applicable       Additional Notes:       Post visit Population Health review of encounter with date of service  5/28/25 with Vinay.  All required HY components in encounter.    Followup appt for: 5/27/26 HY

## 2025-06-23 DIAGNOSIS — N40.1 BPH WITH URINARY OBSTRUCTION: ICD-10-CM

## 2025-06-23 DIAGNOSIS — N13.8 BPH WITH URINARY OBSTRUCTION: ICD-10-CM

## 2025-06-23 RX ORDER — TAMSULOSIN HYDROCHLORIDE 0.4 MG/1
1 CAPSULE ORAL NIGHTLY
Qty: 90 CAPSULE | Refills: 3 | Status: SHIPPED | OUTPATIENT
Start: 2025-06-23

## 2025-06-23 NOTE — TELEPHONE ENCOUNTER
Patient was last seen by CHICA Lara NP on 1/25/25.  He has an upcoming appointment with Dr. Alston on 7/21/25.  Pharmacy sent refill request.  Refill for Tamsulosin 0.4mg initiated for 30 days with no refills.

## 2025-07-10 DIAGNOSIS — N40.1 BPH WITH URINARY OBSTRUCTION: ICD-10-CM

## 2025-07-10 DIAGNOSIS — N13.8 BPH WITH URINARY OBSTRUCTION: ICD-10-CM

## 2025-07-10 NOTE — TELEPHONE ENCOUNTER
Copied from CRM #[id number]. Topic: Medications - Medication Refill  >> Jul 10, 2025  1:33 PM Fatimah wrote:  Who Called: Neville Polanco    Refill or New Rx:Refill  RX Name and Strength:tamsulosin (FLOMAX) 0.4 mg Cap  How is the patient currently taking it? (ex. 1XDay):1xday  Is this a 30 day or 90 day RX:90 or until his appointment  Local or Mail Order:[facility], [place] - [address] [facility] [address]  Phone: [phone] Fax: [phone]  Hours: Not open 24 hours        Preferred Method of Contact: Phone Call  Patient's Preferred Phone Number on File: [phone]

## 2025-07-11 RX ORDER — TAMSULOSIN HYDROCHLORIDE 0.4 MG/1
1 CAPSULE ORAL NIGHTLY
Qty: 90 CAPSULE | Refills: 3 | Status: SHIPPED | OUTPATIENT
Start: 2025-07-11

## 2025-07-15 DIAGNOSIS — N13.8 BPH WITH URINARY OBSTRUCTION: ICD-10-CM

## 2025-07-15 DIAGNOSIS — R97.20 ELEVATED PSA: Primary | ICD-10-CM

## 2025-07-15 DIAGNOSIS — N40.1 BPH WITH URINARY OBSTRUCTION: ICD-10-CM

## 2025-07-21 ENCOUNTER — TELEPHONE (OUTPATIENT)
Dept: FAMILY MEDICINE | Facility: CLINIC | Age: 65
End: 2025-07-21
Payer: COMMERCIAL

## 2025-07-21 ENCOUNTER — TELEPHONE (OUTPATIENT)
Dept: UROLOGY | Facility: CLINIC | Age: 65
End: 2025-07-21
Payer: COMMERCIAL

## 2025-07-21 NOTE — TELEPHONE ENCOUNTER
Copied from CRM #[id number]. Topic: General Inquiry - Patient Advice  >> Jul 21, 2025  8:40 AM Nena wrote:  Who Called: Neville Polanco    Caller is requesting assistance/information from provider's office.    Needs to get his PSA done before his appointment today with Dr. Alston at 3 pm. The nurse told him if Dr. Alston does not have it before his appointment he will not be seen.       Preferred Method of Contact: Phone Call  Patient's Preferred Phone Number on File: [phone]   Best Call Back Number, if different:  Additional Information:

## 2025-07-21 NOTE — TELEPHONE ENCOUNTER
It looks like he has an order put in by Dr. Alston. All he needs to do is come get it. But the results will not be back by 3pm today if he has it drawn here. It will be with the . He might can go to the hospital in [place] and they might have it back by then but I'm not sure.

## 2025-07-21 NOTE — TELEPHONE ENCOUNTER
Patient called to reschedule his appointment.  New appointment made for 9/17/25 at 1100am.  He voiced understanding and thanked me for rescheduling.